# Patient Record
Sex: MALE | Race: BLACK OR AFRICAN AMERICAN | NOT HISPANIC OR LATINO | Employment: OTHER | URBAN - METROPOLITAN AREA
[De-identification: names, ages, dates, MRNs, and addresses within clinical notes are randomized per-mention and may not be internally consistent; named-entity substitution may affect disease eponyms.]

---

## 2017-01-25 ENCOUNTER — APPOINTMENT (OUTPATIENT)
Dept: LAB | Facility: CLINIC | Age: 67
End: 2017-01-25
Payer: MEDICARE

## 2017-01-25 ENCOUNTER — TRANSCRIBE ORDERS (OUTPATIENT)
Dept: LAB | Facility: CLINIC | Age: 67
End: 2017-01-25

## 2017-01-25 ENCOUNTER — ALLSCRIPTS OFFICE VISIT (OUTPATIENT)
Dept: OTHER | Facility: OTHER | Age: 67
End: 2017-01-25

## 2017-01-25 DIAGNOSIS — C22.0 LIVER CELL CARCINOMA (HCC): ICD-10-CM

## 2017-01-25 LAB
ALBUMIN SERPL BCP-MCNC: 2.7 G/DL (ref 3.5–5)
ALP SERPL-CCNC: 187 U/L (ref 46–116)
ALT SERPL W P-5'-P-CCNC: 98 U/L (ref 12–78)
ANION GAP SERPL CALCULATED.3IONS-SCNC: 5 MMOL/L (ref 4–13)
AST SERPL W P-5'-P-CCNC: 165 U/L (ref 5–45)
BILIRUB SERPL-MCNC: 3.4 MG/DL (ref 0.2–1)
BUN SERPL-MCNC: 7 MG/DL (ref 5–25)
CALCIUM SERPL-MCNC: 8.5 MG/DL (ref 8.3–10.1)
CHLORIDE SERPL-SCNC: 106 MMOL/L (ref 100–108)
CO2 SERPL-SCNC: 29 MMOL/L (ref 21–32)
CREAT SERPL-MCNC: 1.07 MG/DL (ref 0.6–1.3)
GFR SERPL CREATININE-BSD FRML MDRD: >60 ML/MIN/1.73SQ M
GLUCOSE SERPL-MCNC: 110 MG/DL (ref 65–140)
POTASSIUM SERPL-SCNC: 3.6 MMOL/L (ref 3.5–5.3)
PROT SERPL-MCNC: 7.7 G/DL (ref 6.4–8.2)
SODIUM SERPL-SCNC: 140 MMOL/L (ref 136–145)

## 2017-01-25 PROCEDURE — 80053 COMPREHEN METABOLIC PANEL: CPT

## 2017-01-25 PROCEDURE — 36415 COLL VENOUS BLD VENIPUNCTURE: CPT

## 2017-01-26 ENCOUNTER — GENERIC CONVERSION - ENCOUNTER (OUTPATIENT)
Dept: OTHER | Facility: OTHER | Age: 67
End: 2017-01-26

## 2017-01-30 ENCOUNTER — APPOINTMENT (OUTPATIENT)
Dept: LAB | Facility: CLINIC | Age: 67
End: 2017-01-30
Payer: MEDICARE

## 2017-01-30 DIAGNOSIS — C22.0 LIVER CELL CARCINOMA (HCC): ICD-10-CM

## 2017-01-30 LAB
ALBUMIN SERPL BCP-MCNC: 2.5 G/DL (ref 3.5–5)
ALP SERPL-CCNC: 175 U/L (ref 46–116)
ALT SERPL W P-5'-P-CCNC: 86 U/L (ref 12–78)
ANION GAP SERPL CALCULATED.3IONS-SCNC: 6 MMOL/L (ref 4–13)
AST SERPL W P-5'-P-CCNC: 154 U/L (ref 5–45)
BILIRUB SERPL-MCNC: 3.1 MG/DL (ref 0.2–1)
BUN SERPL-MCNC: 6 MG/DL (ref 5–25)
CALCIUM SERPL-MCNC: 8.6 MG/DL (ref 8.3–10.1)
CHLORIDE SERPL-SCNC: 105 MMOL/L (ref 100–108)
CO2 SERPL-SCNC: 28 MMOL/L (ref 21–32)
CREAT SERPL-MCNC: 1.05 MG/DL (ref 0.6–1.3)
GFR SERPL CREATININE-BSD FRML MDRD: >60 ML/MIN/1.73SQ M
GLUCOSE SERPL-MCNC: 200 MG/DL (ref 65–140)
POTASSIUM SERPL-SCNC: 4 MMOL/L (ref 3.5–5.3)
PROT SERPL-MCNC: 7.6 G/DL (ref 6.4–8.2)
SODIUM SERPL-SCNC: 139 MMOL/L (ref 136–145)

## 2017-01-30 PROCEDURE — 36415 COLL VENOUS BLD VENIPUNCTURE: CPT

## 2017-01-30 PROCEDURE — 80053 COMPREHEN METABOLIC PANEL: CPT

## 2017-01-31 ENCOUNTER — ALLSCRIPTS OFFICE VISIT (OUTPATIENT)
Dept: OTHER | Facility: OTHER | Age: 67
End: 2017-01-31

## 2017-01-31 ENCOUNTER — GENERIC CONVERSION - ENCOUNTER (OUTPATIENT)
Dept: OTHER | Facility: OTHER | Age: 67
End: 2017-01-31

## 2017-02-01 ENCOUNTER — HOSPITAL ENCOUNTER (OUTPATIENT)
Dept: CT IMAGING | Facility: HOSPITAL | Age: 67
Discharge: HOME | End: 2017-02-01
Attending: SURGERY
Payer: MEDICARE

## 2017-02-01 DIAGNOSIS — C22.0 LIVER CELL CARCINOMA (HCC): ICD-10-CM

## 2017-02-01 PROCEDURE — 74177 CT ABD & PELVIS W/CONTRAST: CPT

## 2017-02-01 RX ADMIN — IOHEXOL 100 ML: 350 INJECTION, SOLUTION INTRAVENOUS at 09:08

## 2017-02-15 ENCOUNTER — TRANSCRIBE ORDERS (OUTPATIENT)
Dept: LAB | Facility: CLINIC | Age: 67
End: 2017-02-15

## 2017-02-15 ENCOUNTER — ALLSCRIPTS OFFICE VISIT (OUTPATIENT)
Dept: OTHER | Facility: OTHER | Age: 67
End: 2017-02-15

## 2017-02-15 ENCOUNTER — APPOINTMENT (OUTPATIENT)
Dept: LAB | Facility: CLINIC | Age: 67
End: 2017-02-15
Payer: MEDICARE

## 2017-02-15 DIAGNOSIS — R18.8 OTHER ASCITES: ICD-10-CM

## 2017-02-15 DIAGNOSIS — C22.0 LIVER CELL CARCINOMA (HCC): ICD-10-CM

## 2017-02-15 LAB
ALBUMIN SERPL BCP-MCNC: 2.7 G/DL (ref 3.5–5)
ALP SERPL-CCNC: 196 U/L (ref 46–116)
ALT SERPL W P-5'-P-CCNC: 95 U/L (ref 12–78)
ANION GAP SERPL CALCULATED.3IONS-SCNC: 4 MMOL/L (ref 4–13)
AST SERPL W P-5'-P-CCNC: 176 U/L (ref 5–45)
BILIRUB DIRECT SERPL-MCNC: 1.66 MG/DL (ref 0–0.2)
BILIRUB SERPL-MCNC: 2.5 MG/DL (ref 0.2–1)
BUN SERPL-MCNC: 8 MG/DL (ref 5–25)
CALCIUM SERPL-MCNC: 8.2 MG/DL (ref 8.3–10.1)
CHLORIDE SERPL-SCNC: 105 MMOL/L (ref 100–108)
CO2 SERPL-SCNC: 32 MMOL/L (ref 21–32)
CREAT SERPL-MCNC: 1.08 MG/DL (ref 0.6–1.3)
ERYTHROCYTE [DISTWIDTH] IN BLOOD BY AUTOMATED COUNT: 13.5 % (ref 11.6–15.1)
GFR SERPL CREATININE-BSD FRML MDRD: >60 ML/MIN/1.73SQ M
GLUCOSE SERPL-MCNC: 84 MG/DL (ref 65–140)
HCT VFR BLD AUTO: 38.1 % (ref 36.5–49.3)
HGB BLD-MCNC: 12.5 G/DL (ref 12–17)
INR PPP: 1.88 (ref 0.86–1.16)
MCH RBC QN AUTO: 32.4 PG (ref 26.8–34.3)
MCHC RBC AUTO-ENTMCNC: 32.8 G/DL (ref 31.4–37.4)
MCV RBC AUTO: 99 FL (ref 82–98)
PLATELET # BLD AUTO: 72 THOUSANDS/UL (ref 149–390)
PMV BLD AUTO: 11 FL (ref 8.9–12.7)
POTASSIUM SERPL-SCNC: 3.4 MMOL/L (ref 3.5–5.3)
PROT SERPL-MCNC: 8 G/DL (ref 6.4–8.2)
PROTHROMBIN TIME: 21 SECONDS (ref 12–14.3)
RBC # BLD AUTO: 3.86 MILLION/UL (ref 3.88–5.62)
SODIUM SERPL-SCNC: 141 MMOL/L (ref 136–145)
WBC # BLD AUTO: 5.41 THOUSAND/UL (ref 4.31–10.16)

## 2017-02-15 PROCEDURE — 82105 ALPHA-FETOPROTEIN SERUM: CPT

## 2017-02-15 PROCEDURE — 85027 COMPLETE CBC AUTOMATED: CPT

## 2017-02-15 PROCEDURE — 36415 COLL VENOUS BLD VENIPUNCTURE: CPT

## 2017-02-15 PROCEDURE — 80048 BASIC METABOLIC PNL TOTAL CA: CPT

## 2017-02-15 PROCEDURE — 85610 PROTHROMBIN TIME: CPT

## 2017-02-15 PROCEDURE — 80076 HEPATIC FUNCTION PANEL: CPT

## 2017-02-16 ENCOUNTER — HOSPITAL ENCOUNTER (OUTPATIENT)
Dept: INFUSION CENTER | Facility: HOSPITAL | Age: 67
Discharge: HOME | End: 2017-02-16
Payer: MEDICARE

## 2017-02-16 ENCOUNTER — GENERIC CONVERSION - ENCOUNTER (OUTPATIENT)
Dept: OTHER | Facility: OTHER | Age: 67
End: 2017-02-16

## 2017-02-16 ENCOUNTER — HOSPITAL ENCOUNTER (OUTPATIENT)
Dept: RADIOLOGY | Facility: HOSPITAL | Age: 67
Discharge: HOME | End: 2017-02-16
Attending: INTERNAL MEDICINE | Admitting: RADIOLOGY
Payer: MEDICARE

## 2017-02-16 VITALS
DIASTOLIC BLOOD PRESSURE: 72 MMHG | HEART RATE: 72 BPM | OXYGEN SATURATION: 98 % | SYSTOLIC BLOOD PRESSURE: 125 MMHG | RESPIRATION RATE: 20 BRPM

## 2017-02-16 DIAGNOSIS — R18.8 OTHER ASCITES: ICD-10-CM

## 2017-02-16 LAB
AFP-TM SERPL-MCNC: 112.7 NG/ML (ref 0–8.3)
ALBUMIN FLD-MCNC: 0.6 G/DL
BASOPHILS NFR FLD MANUAL: 1 %
HISTIOCYTES NFR FLD: 6 %
LYMPHOCYTES NFR BLD AUTO: 11 %
MONO+MESO NFR FLD MANUAL: 8 %
MONOCYTES NFR BLD AUTO: 68 %
NEUTS SEG NFR BLD AUTO: 6 %
SITE: NORMAL
TOTAL CELLS COUNTED SPEC: 100
WBC # FLD MANUAL: 114 /UL

## 2017-02-16 PROCEDURE — 49083 ABD PARACENTESIS W/IMAGING: CPT

## 2017-02-16 PROCEDURE — 82042 OTHER SOURCE ALBUMIN QUAN EA: CPT | Performed by: INTERNAL MEDICINE

## 2017-02-16 PROCEDURE — 89051 BODY FLUID CELL COUNT: CPT | Performed by: INTERNAL MEDICINE

## 2017-02-16 PROCEDURE — 88112 CYTOPATH CELL ENHANCE TECH: CPT | Performed by: INTERNAL MEDICINE

## 2017-02-22 DIAGNOSIS — Z86.010 HISTORY OF COLONIC POLYPS: ICD-10-CM

## 2017-02-22 DIAGNOSIS — C22.0 LIVER CELL CARCINOMA (HCC): ICD-10-CM

## 2017-02-22 DIAGNOSIS — R18.8 OTHER ASCITES: ICD-10-CM

## 2017-02-27 ENCOUNTER — GENERIC CONVERSION - ENCOUNTER (OUTPATIENT)
Dept: OTHER | Facility: OTHER | Age: 67
End: 2017-02-27

## 2017-02-27 ENCOUNTER — HOSPITAL ENCOUNTER (OUTPATIENT)
Dept: MRI IMAGING | Facility: HOSPITAL | Age: 67
Discharge: HOME | End: 2017-02-27
Attending: RADIOLOGY
Payer: MEDICARE

## 2017-02-27 DIAGNOSIS — C22.0 LIVER CELL CARCINOMA (HCC): ICD-10-CM

## 2017-02-27 PROCEDURE — 74183 MRI ABD W/O CNTR FLWD CNTR: CPT

## 2017-02-27 PROCEDURE — A9585 GADOBUTROL INJECTION: HCPCS | Performed by: RADIOLOGY

## 2017-02-27 RX ADMIN — GADOBUTROL 7 ML: 604.72 INJECTION INTRAVENOUS at 08:56

## 2017-02-28 ENCOUNTER — ALLSCRIPTS OFFICE VISIT (OUTPATIENT)
Dept: OTHER | Facility: OTHER | Age: 67
End: 2017-02-28

## 2017-03-01 ENCOUNTER — GENERIC CONVERSION - ENCOUNTER (OUTPATIENT)
Dept: OTHER | Facility: OTHER | Age: 67
End: 2017-03-01

## 2017-03-01 ENCOUNTER — APPOINTMENT (OUTPATIENT)
Dept: RADIATION ONCOLOGY | Facility: HOSPITAL | Age: 67
End: 2017-03-01
Attending: RADIOLOGY
Payer: MEDICARE

## 2017-03-01 PROCEDURE — 99214 OFFICE O/P EST MOD 30 MIN: CPT | Performed by: RADIOLOGY

## 2017-03-06 ENCOUNTER — ALLSCRIPTS OFFICE VISIT (OUTPATIENT)
Dept: OTHER | Facility: OTHER | Age: 67
End: 2017-03-06

## 2017-03-28 ENCOUNTER — ALLSCRIPTS OFFICE VISIT (OUTPATIENT)
Dept: OTHER | Facility: OTHER | Age: 67
End: 2017-03-28

## 2017-03-28 DIAGNOSIS — C22.0 LIVER CELL CARCINOMA (HCC): ICD-10-CM

## 2017-04-06 ENCOUNTER — ALLSCRIPTS OFFICE VISIT (OUTPATIENT)
Dept: OTHER | Facility: OTHER | Age: 67
End: 2017-04-06

## 2017-04-11 ENCOUNTER — ALLSCRIPTS OFFICE VISIT (OUTPATIENT)
Dept: OTHER | Facility: OTHER | Age: 67
End: 2017-04-11

## 2017-04-11 ENCOUNTER — GENERIC CONVERSION - ENCOUNTER (OUTPATIENT)
Dept: OTHER | Facility: OTHER | Age: 67
End: 2017-04-11

## 2017-05-01 ENCOUNTER — APPOINTMENT (OUTPATIENT)
Dept: LAB | Facility: CLINIC | Age: 67
End: 2017-05-01
Payer: MEDICARE

## 2017-05-01 ENCOUNTER — ALLSCRIPTS OFFICE VISIT (OUTPATIENT)
Dept: OTHER | Facility: OTHER | Age: 67
End: 2017-05-01

## 2017-05-01 ENCOUNTER — TRANSCRIBE ORDERS (OUTPATIENT)
Dept: LAB | Facility: CLINIC | Age: 67
End: 2017-05-01

## 2017-05-01 DIAGNOSIS — Z86.010 HISTORY OF COLONIC POLYPS: ICD-10-CM

## 2017-05-01 DIAGNOSIS — K74.60 CIRRHOSIS OF LIVER (HCC): ICD-10-CM

## 2017-05-01 DIAGNOSIS — K76.9 LIVER DISEASE: ICD-10-CM

## 2017-05-01 DIAGNOSIS — C22.0 LIVER CELL CARCINOMA (HCC): ICD-10-CM

## 2017-05-01 LAB
ALBUMIN SERPL BCP-MCNC: 2.4 G/DL (ref 3.5–5)
ALP SERPL-CCNC: 173 U/L (ref 46–116)
ALT SERPL W P-5'-P-CCNC: 61 U/L (ref 12–78)
ANION GAP SERPL CALCULATED.3IONS-SCNC: 5 MMOL/L (ref 4–13)
AST SERPL W P-5'-P-CCNC: 93 U/L (ref 5–45)
BASOPHILS # BLD AUTO: 0.03 THOUSANDS/ΜL (ref 0–0.1)
BASOPHILS NFR BLD AUTO: 1 % (ref 0–1)
BILIRUB SERPL-MCNC: 3.1 MG/DL (ref 0.2–1)
BUN SERPL-MCNC: 9 MG/DL (ref 5–25)
CALCIUM SERPL-MCNC: 8.4 MG/DL (ref 8.3–10.1)
CHLORIDE SERPL-SCNC: 107 MMOL/L (ref 100–108)
CO2 SERPL-SCNC: 32 MMOL/L (ref 21–32)
CREAT SERPL-MCNC: 1.02 MG/DL (ref 0.6–1.3)
EOSINOPHIL # BLD AUTO: 0.34 THOUSAND/ΜL (ref 0–0.61)
EOSINOPHIL NFR BLD AUTO: 6 % (ref 0–6)
ERYTHROCYTE [DISTWIDTH] IN BLOOD BY AUTOMATED COUNT: 14.4 % (ref 11.6–15.1)
GFR SERPL CREATININE-BSD FRML MDRD: >60 ML/MIN/1.73SQ M
GLUCOSE SERPL-MCNC: 114 MG/DL (ref 65–140)
HCT VFR BLD AUTO: 37.9 % (ref 36.5–49.3)
HGB BLD-MCNC: 12.8 G/DL (ref 12–17)
LYMPHOCYTES # BLD AUTO: 1.1 THOUSANDS/ΜL (ref 0.6–4.47)
LYMPHOCYTES NFR BLD AUTO: 19 % (ref 14–44)
MCH RBC QN AUTO: 33.3 PG (ref 26.8–34.3)
MCHC RBC AUTO-ENTMCNC: 33.8 G/DL (ref 31.4–37.4)
MCV RBC AUTO: 99 FL (ref 82–98)
MONOCYTES # BLD AUTO: 0.66 THOUSAND/ΜL (ref 0.17–1.22)
MONOCYTES NFR BLD AUTO: 11 % (ref 4–12)
NEUTROPHILS # BLD AUTO: 3.83 THOUSANDS/ΜL (ref 1.85–7.62)
NEUTS SEG NFR BLD AUTO: 63 % (ref 43–75)
PLATELET # BLD AUTO: 78 THOUSANDS/UL (ref 149–390)
PMV BLD AUTO: 10.3 FL (ref 8.9–12.7)
POTASSIUM SERPL-SCNC: 4.1 MMOL/L (ref 3.5–5.3)
PROT SERPL-MCNC: 8 G/DL (ref 6.4–8.2)
RBC # BLD AUTO: 3.84 MILLION/UL (ref 3.88–5.62)
SODIUM SERPL-SCNC: 144 MMOL/L (ref 136–145)
WBC # BLD AUTO: 5.96 THOUSAND/UL (ref 4.31–10.16)

## 2017-05-01 PROCEDURE — 80053 COMPREHEN METABOLIC PANEL: CPT

## 2017-05-01 PROCEDURE — 85025 COMPLETE CBC W/AUTO DIFF WBC: CPT

## 2017-05-01 PROCEDURE — 82105 ALPHA-FETOPROTEIN SERUM: CPT

## 2017-05-01 PROCEDURE — 36415 COLL VENOUS BLD VENIPUNCTURE: CPT

## 2017-05-02 LAB — AFP-TM SERPL-MCNC: 27.7 NG/ML (ref 0–8.3)

## 2017-05-04 ENCOUNTER — ALLSCRIPTS OFFICE VISIT (OUTPATIENT)
Dept: OTHER | Facility: OTHER | Age: 67
End: 2017-05-04

## 2017-05-05 ENCOUNTER — HOSPITAL ENCOUNTER (OUTPATIENT)
Dept: RADIOLOGY | Facility: HOSPITAL | Age: 67
Discharge: HOME | End: 2017-05-05
Attending: INTERNAL MEDICINE | Admitting: RADIOLOGY
Payer: MEDICARE

## 2017-05-05 VITALS
SYSTOLIC BLOOD PRESSURE: 128 MMHG | RESPIRATION RATE: 18 BRPM | OXYGEN SATURATION: 100 % | DIASTOLIC BLOOD PRESSURE: 79 MMHG | HEART RATE: 62 BPM

## 2017-05-05 DIAGNOSIS — R18.8 ASCITES: ICD-10-CM

## 2017-05-05 PROCEDURE — 49083 ABD PARACENTESIS W/IMAGING: CPT

## 2017-05-10 ENCOUNTER — GENERIC CONVERSION - ENCOUNTER (OUTPATIENT)
Dept: OTHER | Facility: OTHER | Age: 67
End: 2017-05-10

## 2017-05-19 ENCOUNTER — ALLSCRIPTS OFFICE VISIT (OUTPATIENT)
Dept: OTHER | Facility: OTHER | Age: 67
End: 2017-05-19

## 2017-05-19 ENCOUNTER — APPOINTMENT (OUTPATIENT)
Dept: LAB | Facility: CLINIC | Age: 67
End: 2017-05-19
Payer: MEDICARE

## 2017-05-19 DIAGNOSIS — C22.0 LIVER CELL CARCINOMA (HCC): ICD-10-CM

## 2017-05-19 LAB
ALBUMIN SERPL BCP-MCNC: 2.3 G/DL (ref 3.5–5)
ALP SERPL-CCNC: 191 U/L (ref 46–116)
ALT SERPL W P-5'-P-CCNC: 67 U/L (ref 12–78)
ANION GAP SERPL CALCULATED.3IONS-SCNC: 7 MMOL/L (ref 4–13)
AST SERPL W P-5'-P-CCNC: 93 U/L (ref 5–45)
BASOPHILS # BLD AUTO: 0.03 THOUSANDS/ΜL (ref 0–0.1)
BASOPHILS NFR BLD AUTO: 1 % (ref 0–1)
BILIRUB SERPL-MCNC: 1.8 MG/DL (ref 0.2–1)
BUN SERPL-MCNC: 8 MG/DL (ref 5–25)
CALCIUM SERPL-MCNC: 8.1 MG/DL (ref 8.3–10.1)
CHLORIDE SERPL-SCNC: 107 MMOL/L (ref 100–108)
CO2 SERPL-SCNC: 27 MMOL/L (ref 21–32)
CREAT SERPL-MCNC: 0.96 MG/DL (ref 0.6–1.3)
EOSINOPHIL # BLD AUTO: 0.26 THOUSAND/ΜL (ref 0–0.61)
EOSINOPHIL NFR BLD AUTO: 5 % (ref 0–6)
ERYTHROCYTE [DISTWIDTH] IN BLOOD BY AUTOMATED COUNT: 14.2 % (ref 11.6–15.1)
GFR SERPL CREATININE-BSD FRML MDRD: >60 ML/MIN/1.73SQ M
GLUCOSE SERPL-MCNC: 87 MG/DL (ref 65–140)
HCT VFR BLD AUTO: 36.4 % (ref 36.5–49.3)
HGB BLD-MCNC: 12.7 G/DL (ref 12–17)
LYMPHOCYTES # BLD AUTO: 1.39 THOUSANDS/ΜL (ref 0.6–4.47)
LYMPHOCYTES NFR BLD AUTO: 27 % (ref 14–44)
MCH RBC QN AUTO: 34 PG (ref 26.8–34.3)
MCHC RBC AUTO-ENTMCNC: 34.9 G/DL (ref 31.4–37.4)
MCV RBC AUTO: 97 FL (ref 82–98)
MONOCYTES # BLD AUTO: 0.58 THOUSAND/ΜL (ref 0.17–1.22)
MONOCYTES NFR BLD AUTO: 11 % (ref 4–12)
NEUTROPHILS # BLD AUTO: 2.84 THOUSANDS/ΜL (ref 1.85–7.62)
NEUTS SEG NFR BLD AUTO: 56 % (ref 43–75)
PLATELET # BLD AUTO: 52 THOUSANDS/UL (ref 149–390)
PMV BLD AUTO: 10.9 FL (ref 8.9–12.7)
POTASSIUM SERPL-SCNC: 3.3 MMOL/L (ref 3.5–5.3)
PROT SERPL-MCNC: 7.7 G/DL (ref 6.4–8.2)
RBC # BLD AUTO: 3.74 MILLION/UL (ref 3.88–5.62)
SODIUM SERPL-SCNC: 141 MMOL/L (ref 136–145)
WBC # BLD AUTO: 5.1 THOUSAND/UL (ref 4.31–10.16)

## 2017-05-19 PROCEDURE — 85025 COMPLETE CBC W/AUTO DIFF WBC: CPT

## 2017-05-19 PROCEDURE — 80053 COMPREHEN METABOLIC PANEL: CPT

## 2017-05-19 PROCEDURE — 36415 COLL VENOUS BLD VENIPUNCTURE: CPT

## 2017-05-22 ENCOUNTER — GENERIC CONVERSION - ENCOUNTER (OUTPATIENT)
Dept: OTHER | Facility: OTHER | Age: 67
End: 2017-05-22

## 2017-05-22 ENCOUNTER — APPOINTMENT (OUTPATIENT)
Dept: RADIATION ONCOLOGY | Facility: HOSPITAL | Age: 67
End: 2017-05-22
Attending: RADIOLOGY
Payer: MEDICARE

## 2017-05-22 PROCEDURE — 99214 OFFICE O/P EST MOD 30 MIN: CPT | Performed by: RADIOLOGY

## 2017-05-30 ENCOUNTER — ALLSCRIPTS OFFICE VISIT (OUTPATIENT)
Dept: OTHER | Facility: OTHER | Age: 67
End: 2017-05-30

## 2017-06-01 ENCOUNTER — ALLSCRIPTS OFFICE VISIT (OUTPATIENT)
Dept: OTHER | Facility: OTHER | Age: 67
End: 2017-06-01

## 2017-06-02 DIAGNOSIS — C22.0 LIVER CELL CARCINOMA (HCC): ICD-10-CM

## 2017-06-04 ENCOUNTER — APPOINTMENT (EMERGENCY)
Dept: CT IMAGING | Facility: HOSPITAL | Age: 67
DRG: 392 | End: 2017-06-04
Payer: MEDICARE

## 2017-06-04 ENCOUNTER — HOSPITAL ENCOUNTER (INPATIENT)
Facility: HOSPITAL | Age: 67
LOS: 2 days | Discharge: HOME | DRG: 392 | End: 2017-06-06
Attending: EMERGENCY MEDICINE | Admitting: INTERNAL MEDICINE
Payer: MEDICARE

## 2017-06-04 DIAGNOSIS — C22.0 HEPATOCELLULAR CARCINOMA (HCC): ICD-10-CM

## 2017-06-04 DIAGNOSIS — D69.6 THROMBOCYTOPENIA (HCC): ICD-10-CM

## 2017-06-04 DIAGNOSIS — K85.90 ACUTE PANCREATITIS, UNSPECIFIED COMPLICATION STATUS, UNSPECIFIED PANCREATITIS TYPE: ICD-10-CM

## 2017-06-04 DIAGNOSIS — R16.0 LIVER MASS: ICD-10-CM

## 2017-06-04 DIAGNOSIS — R11.2 INTRACTABLE VOMITING WITH NAUSEA, UNSPECIFIED VOMITING TYPE: ICD-10-CM

## 2017-06-04 DIAGNOSIS — G89.3 CANCER RELATED PAIN: ICD-10-CM

## 2017-06-04 DIAGNOSIS — K52.9 COLITIS: Primary | ICD-10-CM

## 2017-06-04 PROBLEM — K76.6 PORTAL HYPERTENSION (HCC): Status: ACTIVE | Noted: 2017-06-04

## 2017-06-04 LAB
ALBUMIN SERPL BCP-MCNC: 2.6 G/DL (ref 3.5–5)
ALP SERPL-CCNC: 185 U/L (ref 46–116)
ALT SERPL W P-5'-P-CCNC: 75 U/L (ref 12–78)
ANION GAP SERPL CALCULATED.3IONS-SCNC: 7 MMOL/L (ref 4–13)
AST SERPL W P-5'-P-CCNC: 102 U/L (ref 5–45)
BASOPHILS # BLD AUTO: 0.01 THOUSANDS/ΜL (ref 0–0.1)
BASOPHILS NFR BLD AUTO: 0 % (ref 0–1)
BILIRUB DIRECT SERPL-MCNC: 2.45 MG/DL (ref 0–0.2)
BILIRUB SERPL-MCNC: 4.1 MG/DL (ref 0.2–1)
BILIRUB UR QL STRIP: NEGATIVE
BUN SERPL-MCNC: 8 MG/DL (ref 5–25)
CALCIUM SERPL-MCNC: 8 MG/DL (ref 8.3–10.1)
CHLORIDE SERPL-SCNC: 104 MMOL/L (ref 100–108)
CLARITY UR: CLEAR
CLARITY, POC: NORMAL
CO2 SERPL-SCNC: 28 MMOL/L (ref 21–32)
COLOR UR: YELLOW
COLOR, POC: YELLOW
CREAT SERPL-MCNC: 1 MG/DL (ref 0.6–1.3)
EOSINOPHIL # BLD AUTO: 0.01 THOUSAND/ΜL (ref 0–0.61)
EOSINOPHIL NFR BLD AUTO: 0 % (ref 0–6)
ERYTHROCYTE [DISTWIDTH] IN BLOOD BY AUTOMATED COUNT: 13.6 % (ref 11.6–15.1)
EXT BILIRUBIN, UA: NORMAL
EXT BLOOD URINE: NEGATIVE
EXT GLUCOSE, UA: NEGATIVE
EXT KETONES: NEGATIVE
EXT NITRITE, UA: NEGATIVE
EXT PH, UA: 7.5
EXT PROTEIN, UA: NEGATIVE
EXT SPECIFIC GRAVITY, UA: 1.01
EXT UROBILINOGEN: 2
GFR SERPL CREATININE-BSD FRML MDRD: >60 ML/MIN/1.73SQ M
GLUCOSE SERPL-MCNC: 141 MG/DL (ref 65–140)
GLUCOSE UR STRIP-MCNC: NEGATIVE MG/DL
HCT VFR BLD AUTO: 38 % (ref 36.5–49.3)
HGB BLD-MCNC: 13.4 G/DL (ref 12–17)
HGB UR QL STRIP.AUTO: NEGATIVE
KETONES UR STRIP-MCNC: NEGATIVE MG/DL
LEUKOCYTE ESTERASE UR QL STRIP: NEGATIVE
LIPASE SERPL-CCNC: 1690 U/L (ref 73–393)
LYMPHOCYTES # BLD AUTO: 0.43 THOUSANDS/ΜL (ref 0.6–4.47)
LYMPHOCYTES NFR BLD AUTO: 6 % (ref 14–44)
MCH RBC QN AUTO: 33.6 PG (ref 26.8–34.3)
MCHC RBC AUTO-ENTMCNC: 35.3 G/DL (ref 31.4–37.4)
MCV RBC AUTO: 95 FL (ref 82–98)
MONOCYTES # BLD AUTO: 0.72 THOUSAND/ΜL (ref 0.17–1.22)
MONOCYTES NFR BLD AUTO: 11 % (ref 4–12)
NEUTROPHILS # BLD AUTO: 5.52 THOUSANDS/ΜL (ref 1.85–7.62)
NEUTS SEG NFR BLD AUTO: 83 % (ref 43–75)
NITRITE UR QL STRIP: NEGATIVE
PH UR STRIP.AUTO: 6.5 [PH] (ref 4.5–8)
PLATELET # BLD AUTO: 50 THOUSANDS/UL (ref 149–390)
PLATELET # BLD AUTO: 53 THOUSANDS/UL (ref 149–390)
PMV BLD AUTO: 11.3 FL (ref 8.9–12.7)
PMV BLD AUTO: 11.7 FL (ref 8.9–12.7)
POTASSIUM SERPL-SCNC: 3.4 MMOL/L (ref 3.5–5.3)
PROT SERPL-MCNC: 8.1 G/DL (ref 6.4–8.2)
PROT UR STRIP-MCNC: NEGATIVE MG/DL
RBC # BLD AUTO: 3.99 MILLION/UL (ref 3.88–5.62)
SODIUM SERPL-SCNC: 139 MMOL/L (ref 136–145)
SP GR UR STRIP.AUTO: 1.01 (ref 1–1.03)
UROBILINOGEN UR QL STRIP.AUTO: 4 E.U./DL
WBC # BLD AUTO: 6.69 THOUSAND/UL (ref 4.31–10.16)
WBC # BLD EST: NEGATIVE 10*3/UL

## 2017-06-04 PROCEDURE — 83690 ASSAY OF LIPASE: CPT | Performed by: PHYSICIAN ASSISTANT

## 2017-06-04 PROCEDURE — 96375 TX/PRO/DX INJ NEW DRUG ADDON: CPT

## 2017-06-04 PROCEDURE — 85025 COMPLETE CBC W/AUTO DIFF WBC: CPT | Performed by: PHYSICIAN ASSISTANT

## 2017-06-04 PROCEDURE — 96361 HYDRATE IV INFUSION ADD-ON: CPT

## 2017-06-04 PROCEDURE — 87040 BLOOD CULTURE FOR BACTERIA: CPT | Performed by: INTERNAL MEDICINE

## 2017-06-04 PROCEDURE — 74177 CT ABD & PELVIS W/CONTRAST: CPT

## 2017-06-04 PROCEDURE — 36415 COLL VENOUS BLD VENIPUNCTURE: CPT | Performed by: PHYSICIAN ASSISTANT

## 2017-06-04 PROCEDURE — 94664 DEMO&/EVAL PT USE INHALER: CPT

## 2017-06-04 PROCEDURE — 94760 N-INVAS EAR/PLS OXIMETRY 1: CPT

## 2017-06-04 PROCEDURE — 81002 URINALYSIS NONAUTO W/O SCOPE: CPT | Performed by: PHYSICIAN ASSISTANT

## 2017-06-04 PROCEDURE — 81003 URINALYSIS AUTO W/O SCOPE: CPT | Performed by: INTERNAL MEDICINE

## 2017-06-04 PROCEDURE — 99285 EMERGENCY DEPT VISIT HI MDM: CPT

## 2017-06-04 PROCEDURE — 96374 THER/PROPH/DIAG INJ IV PUSH: CPT

## 2017-06-04 PROCEDURE — 96376 TX/PRO/DX INJ SAME DRUG ADON: CPT

## 2017-06-04 PROCEDURE — 82248 BILIRUBIN DIRECT: CPT | Performed by: PHYSICIAN ASSISTANT

## 2017-06-04 PROCEDURE — 85049 AUTOMATED PLATELET COUNT: CPT | Performed by: INTERNAL MEDICINE

## 2017-06-04 PROCEDURE — 80053 COMPREHEN METABOLIC PANEL: CPT | Performed by: PHYSICIAN ASSISTANT

## 2017-06-04 RX ORDER — CIPROFLOXACIN 2 MG/ML
400 INJECTION, SOLUTION INTRAVENOUS EVERY 12 HOURS
Status: DISCONTINUED | OUTPATIENT
Start: 2017-06-05 | End: 2017-06-06 | Stop reason: HOSPADM

## 2017-06-04 RX ORDER — DEXTROSE, SODIUM CHLORIDE, AND POTASSIUM CHLORIDE 5; .45; .15 G/100ML; G/100ML; G/100ML
75 INJECTION INTRAVENOUS CONTINUOUS
Status: DISCONTINUED | OUTPATIENT
Start: 2017-06-04 | End: 2017-06-06 | Stop reason: HOSPADM

## 2017-06-04 RX ORDER — BISACODYL 10 MG
10 SUPPOSITORY, RECTAL RECTAL AS NEEDED
Status: DISCONTINUED | OUTPATIENT
Start: 2017-06-04 | End: 2017-06-06 | Stop reason: HOSPADM

## 2017-06-04 RX ORDER — SORAFENIB 200 MG/1
400 TABLET, FILM COATED ORAL EVERY MORNING
COMMUNITY
Start: 2017-03-28

## 2017-06-04 RX ORDER — ONDANSETRON 2 MG/ML
4 INJECTION INTRAMUSCULAR; INTRAVENOUS EVERY 6 HOURS PRN
Status: DISCONTINUED | OUTPATIENT
Start: 2017-06-04 | End: 2017-06-06 | Stop reason: HOSPADM

## 2017-06-04 RX ORDER — MORPHINE SULFATE 10 MG/ML
6 INJECTION, SOLUTION INTRAMUSCULAR; INTRAVENOUS ONCE
Status: COMPLETED | OUTPATIENT
Start: 2017-06-04 | End: 2017-06-04

## 2017-06-04 RX ORDER — METHYLPHENIDATE HYDROCHLORIDE 10 MG/1
10 TABLET ORAL DAILY
COMMUNITY
Start: 2017-01-25

## 2017-06-04 RX ORDER — MORPHINE SULFATE 4 MG/ML
4 INJECTION, SOLUTION INTRAMUSCULAR; INTRAVENOUS ONCE
Status: COMPLETED | OUTPATIENT
Start: 2017-06-04 | End: 2017-06-04

## 2017-06-04 RX ORDER — FUROSEMIDE 40 MG/1
40 TABLET ORAL AS NEEDED
COMMUNITY
Start: 2017-02-15

## 2017-06-04 RX ORDER — MAGNESIUM HYDROXIDE/ALUMINUM HYDROXICE/SIMETHICONE 120; 1200; 1200 MG/30ML; MG/30ML; MG/30ML
30 SUSPENSION ORAL EVERY 6 HOURS PRN
Status: DISCONTINUED | OUTPATIENT
Start: 2017-06-04 | End: 2017-06-06 | Stop reason: HOSPADM

## 2017-06-04 RX ORDER — SORAFENIB 200 MG/1
400 TABLET, FILM COATED ORAL EVERY EVENING
COMMUNITY

## 2017-06-04 RX ORDER — SPIRONOLACTONE 50 MG/1
50 TABLET, FILM COATED ORAL 2 TIMES DAILY
COMMUNITY
Start: 2017-02-15

## 2017-06-04 RX ORDER — ONDANSETRON 2 MG/ML
4 INJECTION INTRAMUSCULAR; INTRAVENOUS ONCE
Status: COMPLETED | OUTPATIENT
Start: 2017-06-04 | End: 2017-06-04

## 2017-06-04 RX ORDER — FENTANYL 12 UG/H
1 PATCH TRANSDERMAL
Status: DISCONTINUED | OUTPATIENT
Start: 2017-06-04 | End: 2017-06-06 | Stop reason: HOSPADM

## 2017-06-04 RX ORDER — FENTANYL 12 UG/H
PATCH TRANSDERMAL
COMMUNITY
Start: 2017-03-06

## 2017-06-04 RX ORDER — NORTRIPTYLINE HYDROCHLORIDE 25 MG/1
25 CAPSULE ORAL
Status: DISCONTINUED | OUTPATIENT
Start: 2017-06-04 | End: 2017-06-06

## 2017-06-04 RX ORDER — NORTRIPTYLINE HYDROCHLORIDE 25 MG/1
25 CAPSULE ORAL
COMMUNITY
Start: 2016-11-30

## 2017-06-04 RX ORDER — HEPARIN SODIUM 5000 [USP'U]/ML
5000 INJECTION, SOLUTION INTRAVENOUS; SUBCUTANEOUS EVERY 8 HOURS SCHEDULED
Status: DISCONTINUED | OUTPATIENT
Start: 2017-06-04 | End: 2017-06-05

## 2017-06-04 RX ORDER — CIPROFLOXACIN 2 MG/ML
400 INJECTION, SOLUTION INTRAVENOUS ONCE
Status: COMPLETED | OUTPATIENT
Start: 2017-06-04 | End: 2017-06-04

## 2017-06-04 RX ORDER — MORPHINE SULFATE 4 MG/ML
4 INJECTION, SOLUTION INTRAMUSCULAR; INTRAVENOUS EVERY 4 HOURS PRN
Status: DISCONTINUED | OUTPATIENT
Start: 2017-06-04 | End: 2017-06-06 | Stop reason: HOSPADM

## 2017-06-04 RX ORDER — POLYETHYLENE GLYCOL 3350 17 G/17G
17 POWDER, FOR SOLUTION ORAL DAILY
Status: DISCONTINUED | OUTPATIENT
Start: 2017-06-05 | End: 2017-06-06 | Stop reason: HOSPADM

## 2017-06-04 RX ORDER — DOCUSATE SODIUM 100 MG/1
100 CAPSULE, LIQUID FILLED ORAL 2 TIMES DAILY
Status: DISCONTINUED | OUTPATIENT
Start: 2017-06-04 | End: 2017-06-06 | Stop reason: HOSPADM

## 2017-06-04 RX ORDER — SENNOSIDES 8.6 MG
2 TABLET ORAL 2 TIMES DAILY
Status: DISCONTINUED | OUTPATIENT
Start: 2017-06-04 | End: 2017-06-06 | Stop reason: HOSPADM

## 2017-06-04 RX ORDER — OXYCODONE HCL 5 MG/5 ML
5 SOLUTION, ORAL ORAL EVERY 4 HOURS PRN
Status: DISCONTINUED | OUTPATIENT
Start: 2017-06-04 | End: 2017-06-06 | Stop reason: HOSPADM

## 2017-06-04 RX ADMIN — CIPROFLOXACIN 400 MG: 2 INJECTION, SOLUTION INTRAVENOUS at 18:10

## 2017-06-04 RX ADMIN — HEPARIN SODIUM 5000 UNITS: 5000 INJECTION, SOLUTION INTRAVENOUS; SUBCUTANEOUS at 22:50

## 2017-06-04 RX ADMIN — DEXTROSE, SODIUM CHLORIDE, AND POTASSIUM CHLORIDE 75 ML/HR: 5; .45; .15 INJECTION INTRAVENOUS at 22:09

## 2017-06-04 RX ADMIN — NORTRIPTYLINE HYDROCHLORIDE 25 MG: 25 CAPSULE ORAL at 22:11

## 2017-06-04 RX ADMIN — SODIUM CHLORIDE 1000 ML: 0.9 INJECTION, SOLUTION INTRAVENOUS at 14:41

## 2017-06-04 RX ADMIN — ONDANSETRON 4 MG: 2 INJECTION INTRAMUSCULAR; INTRAVENOUS at 21:01

## 2017-06-04 RX ADMIN — OXYCODONE HYDROCHLORIDE 5 MG: 5 SOLUTION ORAL at 22:10

## 2017-06-04 RX ADMIN — MORPHINE SULFATE 6 MG: 10 INJECTION, SOLUTION INTRAMUSCULAR; INTRAVENOUS at 14:43

## 2017-06-04 RX ADMIN — FENTANYL 1 PATCH: 12.5 PATCH TRANSDERMAL at 22:09

## 2017-06-04 RX ADMIN — IOHEXOL 100 ML: 350 INJECTION, SOLUTION INTRAVENOUS at 16:16

## 2017-06-04 RX ADMIN — MORPHINE SULFATE 4 MG: 4 INJECTION, SOLUTION INTRAMUSCULAR; INTRAVENOUS at 16:33

## 2017-06-04 RX ADMIN — IOHEXOL 50 ML: 240 INJECTION, SOLUTION INTRATHECAL; INTRAVASCULAR; INTRAVENOUS; ORAL at 14:44

## 2017-06-04 RX ADMIN — ONDANSETRON 4 MG: 2 INJECTION INTRAMUSCULAR; INTRAVENOUS at 14:41

## 2017-06-04 RX ADMIN — METRONIDAZOLE 500 MG: 500 INJECTION, SOLUTION INTRAVENOUS at 19:21

## 2017-06-04 RX ADMIN — ONDANSETRON 4 MG: 2 INJECTION INTRAMUSCULAR; INTRAVENOUS at 16:28

## 2017-06-05 ENCOUNTER — APPOINTMENT (INPATIENT)
Dept: RADIOLOGY | Facility: HOSPITAL | Age: 67
DRG: 392 | End: 2017-06-05
Payer: MEDICARE

## 2017-06-05 LAB
ALBUMIN SERPL BCP-MCNC: 2.2 G/DL (ref 3.5–5)
ALP SERPL-CCNC: 139 U/L (ref 46–116)
ALT SERPL W P-5'-P-CCNC: 64 U/L (ref 12–78)
ANION GAP SERPL CALCULATED.3IONS-SCNC: 8 MMOL/L (ref 4–13)
APPEARANCE FLD: CLEAR
AST SERPL W P-5'-P-CCNC: 84 U/L (ref 5–45)
BILIRUB SERPL-MCNC: 2.7 MG/DL (ref 0.2–1)
BUN SERPL-MCNC: 7 MG/DL (ref 5–25)
CALCIUM SERPL-MCNC: 7.7 MG/DL (ref 8.3–10.1)
CHLORIDE SERPL-SCNC: 106 MMOL/L (ref 100–108)
CO2 SERPL-SCNC: 26 MMOL/L (ref 21–32)
COLOR FLD: YELLOW
CREAT SERPL-MCNC: 0.82 MG/DL (ref 0.6–1.3)
ERYTHROCYTE [DISTWIDTH] IN BLOOD BY AUTOMATED COUNT: 13.8 % (ref 11.6–15.1)
GFR SERPL CREATININE-BSD FRML MDRD: >60 ML/MIN/1.73SQ M
GLUCOSE SERPL-MCNC: 126 MG/DL (ref 65–140)
HCT VFR BLD AUTO: 36.3 % (ref 36.5–49.3)
HGB BLD-MCNC: 12.5 G/DL (ref 12–17)
INR PPP: 1.83 (ref 0.86–1.16)
LIPASE SERPL-CCNC: 372 U/L (ref 73–393)
LYMPHOCYTES NFR BLD AUTO: 4 %
MAGNESIUM SERPL-MCNC: 1.8 MG/DL (ref 1.6–2.6)
MCH RBC QN AUTO: 33.2 PG (ref 26.8–34.3)
MCHC RBC AUTO-ENTMCNC: 34.4 G/DL (ref 31.4–37.4)
MCV RBC AUTO: 96 FL (ref 82–98)
MONONUC CELLS NFR FLD MANUAL: 40 %
NEUTS SEG NFR BLD AUTO: 56 %
PLATELET # BLD AUTO: 50 THOUSANDS/UL (ref 149–390)
PMV BLD AUTO: 11.3 FL (ref 8.9–12.7)
POTASSIUM SERPL-SCNC: 3.7 MMOL/L (ref 3.5–5.3)
PROT SERPL-MCNC: 7.5 G/DL (ref 6.4–8.2)
PROTHROMBIN TIME: 21.8 SECONDS (ref 12.1–14.4)
RBC # BLD AUTO: 3.77 MILLION/UL (ref 3.88–5.62)
SITE: NORMAL
SODIUM SERPL-SCNC: 140 MMOL/L (ref 136–145)
TOTAL CELLS COUNTED SPEC: 100
WBC # BLD AUTO: 6.02 THOUSAND/UL (ref 4.31–10.16)
WBC # FLD MANUAL: 141 /UL

## 2017-06-05 PROCEDURE — 0W9G3ZX DRAINAGE OF PERITONEAL CAVITY, PERCUTANEOUS APPROACH, DIAGNOSTIC: ICD-10-PCS | Performed by: HOSPITALIST

## 2017-06-05 PROCEDURE — 89051 BODY FLUID CELL COUNT: CPT | Performed by: INTERNAL MEDICINE

## 2017-06-05 PROCEDURE — 49083 ABD PARACENTESIS W/IMAGING: CPT

## 2017-06-05 PROCEDURE — 83735 ASSAY OF MAGNESIUM: CPT | Performed by: INTERNAL MEDICINE

## 2017-06-05 PROCEDURE — 83690 ASSAY OF LIPASE: CPT | Performed by: INTERNAL MEDICINE

## 2017-06-05 PROCEDURE — 85027 COMPLETE CBC AUTOMATED: CPT | Performed by: INTERNAL MEDICINE

## 2017-06-05 PROCEDURE — 80053 COMPREHEN METABOLIC PANEL: CPT | Performed by: INTERNAL MEDICINE

## 2017-06-05 PROCEDURE — 87205 SMEAR GRAM STAIN: CPT | Performed by: INTERNAL MEDICINE

## 2017-06-05 PROCEDURE — 85610 PROTHROMBIN TIME: CPT | Performed by: INTERNAL MEDICINE

## 2017-06-05 PROCEDURE — 87070 CULTURE OTHR SPECIMN AEROBIC: CPT | Performed by: INTERNAL MEDICINE

## 2017-06-05 RX ADMIN — OXYCODONE HYDROCHLORIDE 5 MG: 5 SOLUTION ORAL at 22:31

## 2017-06-05 RX ADMIN — CIPROFLOXACIN 400 MG: 2 INJECTION, SOLUTION INTRAVENOUS at 06:05

## 2017-06-05 RX ADMIN — SENNOSIDES 17.2 MG: 8.6 TABLET, FILM COATED ORAL at 17:28

## 2017-06-05 RX ADMIN — DOCUSATE SODIUM 100 MG: 100 CAPSULE, LIQUID FILLED ORAL at 17:28

## 2017-06-05 RX ADMIN — HEPARIN SODIUM 5000 UNITS: 5000 INJECTION, SOLUTION INTRAVENOUS; SUBCUTANEOUS at 06:05

## 2017-06-05 RX ADMIN — POLYETHYLENE GLYCOL 3350 17 G: 17 POWDER, FOR SOLUTION ORAL at 08:48

## 2017-06-05 RX ADMIN — DEXTROSE, SODIUM CHLORIDE, AND POTASSIUM CHLORIDE 75 ML/HR: 5; .45; .15 INJECTION INTRAVENOUS at 13:15

## 2017-06-05 RX ADMIN — METRONIDAZOLE 500 MG: 500 INJECTION, SOLUTION INTRAVENOUS at 20:16

## 2017-06-05 RX ADMIN — DOCUSATE SODIUM 100 MG: 100 CAPSULE, LIQUID FILLED ORAL at 08:48

## 2017-06-05 RX ADMIN — METRONIDAZOLE 500 MG: 500 INJECTION, SOLUTION INTRAVENOUS at 11:30

## 2017-06-05 RX ADMIN — SENNOSIDES 17.2 MG: 8.6 TABLET, FILM COATED ORAL at 08:48

## 2017-06-05 RX ADMIN — CIPROFLOXACIN 400 MG: 2 INJECTION, SOLUTION INTRAVENOUS at 17:28

## 2017-06-05 RX ADMIN — METRONIDAZOLE 500 MG: 500 INJECTION, SOLUTION INTRAVENOUS at 02:47

## 2017-06-06 VITALS
BODY MASS INDEX: 22.28 KG/M2 | HEIGHT: 71 IN | WEIGHT: 159.17 LBS | OXYGEN SATURATION: 97 % | HEART RATE: 68 BPM | DIASTOLIC BLOOD PRESSURE: 78 MMHG | TEMPERATURE: 97.4 F | SYSTOLIC BLOOD PRESSURE: 124 MMHG | RESPIRATION RATE: 18 BRPM

## 2017-06-06 LAB
ALBUMIN SERPL BCP-MCNC: 2 G/DL (ref 3.5–5)
ALP SERPL-CCNC: 137 U/L (ref 46–116)
ALT SERPL W P-5'-P-CCNC: 59 U/L (ref 12–78)
ANION GAP SERPL CALCULATED.3IONS-SCNC: 6 MMOL/L (ref 4–13)
AST SERPL W P-5'-P-CCNC: 69 U/L (ref 5–45)
BILIRUB SERPL-MCNC: 2.2 MG/DL (ref 0.2–1)
BUN SERPL-MCNC: 6 MG/DL (ref 5–25)
CALCIUM SERPL-MCNC: 7.4 MG/DL (ref 8.3–10.1)
CHLORIDE SERPL-SCNC: 107 MMOL/L (ref 100–108)
CO2 SERPL-SCNC: 28 MMOL/L (ref 21–32)
CREAT SERPL-MCNC: 0.89 MG/DL (ref 0.6–1.3)
ERYTHROCYTE [DISTWIDTH] IN BLOOD BY AUTOMATED COUNT: 13.6 % (ref 11.6–15.1)
GFR SERPL CREATININE-BSD FRML MDRD: >60 ML/MIN/1.73SQ M
GLUCOSE SERPL-MCNC: 99 MG/DL (ref 65–140)
HCT VFR BLD AUTO: 37.3 % (ref 36.5–49.3)
HGB BLD-MCNC: 12.7 G/DL (ref 12–17)
INR PPP: 2.08 (ref 0.86–1.16)
LIPASE SERPL-CCNC: 672 U/L (ref 73–393)
MAGNESIUM SERPL-MCNC: 1.7 MG/DL (ref 1.6–2.6)
MCH RBC QN AUTO: 32.9 PG (ref 26.8–34.3)
MCHC RBC AUTO-ENTMCNC: 34 G/DL (ref 31.4–37.4)
MCV RBC AUTO: 97 FL (ref 82–98)
PLATELET # BLD AUTO: 51 THOUSANDS/UL (ref 149–390)
PMV BLD AUTO: 11.5 FL (ref 8.9–12.7)
POTASSIUM SERPL-SCNC: 3.4 MMOL/L (ref 3.5–5.3)
PROT SERPL-MCNC: 6.8 G/DL (ref 6.4–8.2)
PROTHROMBIN TIME: 24.1 SECONDS (ref 12.1–14.4)
RBC # BLD AUTO: 3.86 MILLION/UL (ref 3.88–5.62)
SODIUM SERPL-SCNC: 141 MMOL/L (ref 136–145)
WBC # BLD AUTO: 5.97 THOUSAND/UL (ref 4.31–10.16)

## 2017-06-06 PROCEDURE — 83690 ASSAY OF LIPASE: CPT | Performed by: INTERNAL MEDICINE

## 2017-06-06 PROCEDURE — 83735 ASSAY OF MAGNESIUM: CPT | Performed by: INTERNAL MEDICINE

## 2017-06-06 PROCEDURE — 87177 OVA AND PARASITES SMEARS: CPT | Performed by: INTERNAL MEDICINE

## 2017-06-06 PROCEDURE — 87015 SPECIMEN INFECT AGNT CONCNTJ: CPT | Performed by: INTERNAL MEDICINE

## 2017-06-06 PROCEDURE — 80053 COMPREHEN METABOLIC PANEL: CPT | Performed by: INTERNAL MEDICINE

## 2017-06-06 PROCEDURE — 85610 PROTHROMBIN TIME: CPT | Performed by: NURSE PRACTITIONER

## 2017-06-06 PROCEDURE — 89055 LEUKOCYTE ASSESSMENT FECAL: CPT | Performed by: INTERNAL MEDICINE

## 2017-06-06 PROCEDURE — 87899 AGENT NOS ASSAY W/OPTIC: CPT | Performed by: INTERNAL MEDICINE

## 2017-06-06 PROCEDURE — 87493 C DIFF AMPLIFIED PROBE: CPT | Performed by: INTERNAL MEDICINE

## 2017-06-06 PROCEDURE — 85027 COMPLETE CBC AUTOMATED: CPT | Performed by: INTERNAL MEDICINE

## 2017-06-06 PROCEDURE — 87046 STOOL CULTR AEROBIC BACT EA: CPT | Performed by: INTERNAL MEDICINE

## 2017-06-06 PROCEDURE — 87045 FECES CULTURE AEROBIC BACT: CPT | Performed by: INTERNAL MEDICINE

## 2017-06-06 PROCEDURE — 82105 ALPHA-FETOPROTEIN SERUM: CPT | Performed by: PHYSICIAN ASSISTANT

## 2017-06-06 PROCEDURE — 87209 SMEAR COMPLEX STAIN: CPT | Performed by: INTERNAL MEDICINE

## 2017-06-06 RX ORDER — CIPROFLOXACIN 500 MG/1
500 TABLET, FILM COATED ORAL 2 TIMES DAILY
Qty: 10 TABLET | Refills: 0 | Status: SHIPPED | OUTPATIENT
Start: 2017-06-06 | End: 2017-06-11

## 2017-06-06 RX ORDER — METRONIDAZOLE 500 MG/1
500 TABLET ORAL 3 TIMES DAILY
Qty: 15 TABLET | Refills: 0 | Status: SHIPPED | OUTPATIENT
Start: 2017-06-06 | End: 2017-06-11

## 2017-06-06 RX ORDER — POLYETHYLENE GLYCOL 3350 17 G/17G
17 POWDER, FOR SOLUTION ORAL DAILY
Qty: 14 EACH | Refills: 0 | Status: SHIPPED | OUTPATIENT
Start: 2017-06-06

## 2017-06-06 RX ORDER — MAGNESIUM HYDROXIDE/ALUMINUM HYDROXICE/SIMETHICONE 120; 1200; 1200 MG/30ML; MG/30ML; MG/30ML
30 SUSPENSION ORAL EVERY 6 HOURS PRN
Qty: 355 ML | Refills: 0 | Status: SHIPPED | OUTPATIENT
Start: 2017-06-06

## 2017-06-06 RX ADMIN — METRONIDAZOLE 500 MG: 500 INJECTION, SOLUTION INTRAVENOUS at 11:34

## 2017-06-06 RX ADMIN — DEXTROSE, SODIUM CHLORIDE, AND POTASSIUM CHLORIDE 75 ML/HR: 5; .45; .15 INJECTION INTRAVENOUS at 05:22

## 2017-06-06 RX ADMIN — CIPROFLOXACIN 400 MG: 2 INJECTION, SOLUTION INTRAVENOUS at 05:23

## 2017-06-06 RX ADMIN — FENTANYL 1 PATCH: 12.5 PATCH TRANSDERMAL at 07:03

## 2017-06-06 RX ADMIN — METRONIDAZOLE 500 MG: 500 INJECTION, SOLUTION INTRAVENOUS at 04:17

## 2017-06-07 LAB
AFP-TM SERPL-MCNC: 23.6 NG/ML (ref 0–8.3)
C DIFF TOX GENS STL QL NAA+PROBE: NORMAL

## 2017-06-08 LAB
BACTERIA SPEC BFLD CULT: NO GROWTH
BACTERIA STL CULT: NORMAL
BACTERIA STL CULT: NORMAL
GRAM STN SPEC: NORMAL

## 2017-06-09 LAB
O+P STL CONC: NORMAL
WBC SPEC QL GRAM STN: NORMAL

## 2017-06-10 LAB
BACTERIA BLD CULT: NORMAL
BACTERIA BLD CULT: NORMAL

## 2017-06-20 ENCOUNTER — GENERIC CONVERSION - ENCOUNTER (OUTPATIENT)
Dept: OTHER | Facility: OTHER | Age: 67
End: 2017-06-20

## 2017-06-27 ENCOUNTER — TRANSCRIBE ORDERS (OUTPATIENT)
Dept: LAB | Facility: HOSPITAL | Age: 67
End: 2017-06-27

## 2017-06-27 ENCOUNTER — APPOINTMENT (OUTPATIENT)
Dept: LAB | Facility: HOSPITAL | Age: 67
End: 2017-06-27
Attending: INTERNAL MEDICINE
Payer: MEDICARE

## 2017-06-27 DIAGNOSIS — Z86.010 HISTORY OF COLONIC POLYPS: ICD-10-CM

## 2017-06-27 DIAGNOSIS — C22.0 LIVER CELL CARCINOMA (HCC): ICD-10-CM

## 2017-06-27 LAB
ALBUMIN SERPL BCP-MCNC: 2.6 G/DL (ref 3.5–5)
ALP SERPL-CCNC: 198 U/L (ref 46–116)
ALT SERPL W P-5'-P-CCNC: 109 U/L (ref 12–78)
ANION GAP SERPL CALCULATED.3IONS-SCNC: 5 MMOL/L (ref 4–13)
AST SERPL W P-5'-P-CCNC: 84 U/L (ref 5–45)
BASOPHILS # BLD AUTO: 0.01 THOUSANDS/ΜL (ref 0–0.1)
BASOPHILS NFR BLD AUTO: 0 % (ref 0–1)
BILIRUB SERPL-MCNC: 3.02 MG/DL (ref 0.2–1)
BUN SERPL-MCNC: 14 MG/DL (ref 5–25)
CALCIUM SERPL-MCNC: 8.8 MG/DL (ref 8.3–10.1)
CHLORIDE SERPL-SCNC: 106 MMOL/L (ref 100–108)
CO2 SERPL-SCNC: 29 MMOL/L (ref 21–32)
CREAT SERPL-MCNC: 0.87 MG/DL (ref 0.6–1.3)
EOSINOPHIL # BLD AUTO: 0.07 THOUSAND/ΜL (ref 0–0.61)
EOSINOPHIL NFR BLD AUTO: 1 % (ref 0–6)
ERYTHROCYTE [DISTWIDTH] IN BLOOD BY AUTOMATED COUNT: 14.1 % (ref 11.6–15.1)
GFR SERPL CREATININE-BSD FRML MDRD: >60 ML/MIN/1.73SQ M
GLUCOSE SERPL-MCNC: 111 MG/DL (ref 65–140)
HCT VFR BLD AUTO: 42.3 % (ref 36.5–49.3)
HGB BLD-MCNC: 14.9 G/DL (ref 12–17)
LYMPHOCYTES # BLD AUTO: 1.35 THOUSANDS/ΜL (ref 0.6–4.47)
LYMPHOCYTES NFR BLD AUTO: 17 % (ref 14–44)
MCH RBC QN AUTO: 34.3 PG (ref 26.8–34.3)
MCHC RBC AUTO-ENTMCNC: 35.2 G/DL (ref 31.4–37.4)
MCV RBC AUTO: 98 FL (ref 82–98)
MONOCYTES # BLD AUTO: 0.56 THOUSAND/ΜL (ref 0.17–1.22)
MONOCYTES NFR BLD AUTO: 7 % (ref 4–12)
NEUTROPHILS # BLD AUTO: 5.99 THOUSANDS/ΜL (ref 1.85–7.62)
NEUTS SEG NFR BLD AUTO: 75 % (ref 43–75)
NRBC BLD AUTO-RTO: 0 /100 WBCS
PLATELET # BLD AUTO: 43 THOUSANDS/UL (ref 149–390)
PMV BLD AUTO: 11.8 FL (ref 8.9–12.7)
POTASSIUM SERPL-SCNC: 3.8 MMOL/L (ref 3.5–5.3)
PROT SERPL-MCNC: 8.1 G/DL (ref 6.4–8.2)
RBC # BLD AUTO: 4.34 MILLION/UL (ref 3.88–5.62)
SODIUM SERPL-SCNC: 140 MMOL/L (ref 136–145)
WBC # BLD AUTO: 8 THOUSAND/UL (ref 4.31–10.16)

## 2017-06-27 PROCEDURE — 80053 COMPREHEN METABOLIC PANEL: CPT

## 2017-06-27 PROCEDURE — 36415 COLL VENOUS BLD VENIPUNCTURE: CPT

## 2017-06-27 PROCEDURE — 85025 COMPLETE CBC W/AUTO DIFF WBC: CPT

## 2017-06-28 ENCOUNTER — ALLSCRIPTS OFFICE VISIT (OUTPATIENT)
Dept: OTHER | Facility: OTHER | Age: 67
End: 2017-06-28

## 2017-06-29 ENCOUNTER — ALLSCRIPTS OFFICE VISIT (OUTPATIENT)
Dept: OTHER | Facility: OTHER | Age: 67
End: 2017-06-29

## 2017-07-26 ENCOUNTER — APPOINTMENT (OUTPATIENT)
Dept: LAB | Facility: HOSPITAL | Age: 67
End: 2017-07-26
Attending: INTERNAL MEDICINE
Payer: MEDICARE

## 2017-07-26 DIAGNOSIS — C22.0 LIVER CELL CARCINOMA (HCC): ICD-10-CM

## 2017-07-26 LAB
ALBUMIN SERPL BCP-MCNC: 2.3 G/DL (ref 3.5–5)
ALP SERPL-CCNC: 122 U/L (ref 46–116)
ALT SERPL W P-5'-P-CCNC: 48 U/L (ref 12–78)
ANION GAP SERPL CALCULATED.3IONS-SCNC: 3 MMOL/L (ref 4–13)
AST SERPL W P-5'-P-CCNC: 73 U/L (ref 5–45)
BASOPHILS # BLD AUTO: 0.03 THOUSANDS/ΜL (ref 0–0.1)
BASOPHILS NFR BLD AUTO: 0 % (ref 0–1)
BILIRUB SERPL-MCNC: 1.83 MG/DL (ref 0.2–1)
BUN SERPL-MCNC: 6 MG/DL (ref 5–25)
CALCIUM SERPL-MCNC: 8.2 MG/DL (ref 8.3–10.1)
CHLORIDE SERPL-SCNC: 106 MMOL/L (ref 100–108)
CO2 SERPL-SCNC: 29 MMOL/L (ref 21–32)
CREAT SERPL-MCNC: 0.84 MG/DL (ref 0.6–1.3)
EOSINOPHIL # BLD AUTO: 0.83 THOUSAND/ΜL (ref 0–0.61)
EOSINOPHIL NFR BLD AUTO: 12 % (ref 0–6)
ERYTHROCYTE [DISTWIDTH] IN BLOOD BY AUTOMATED COUNT: 14.1 % (ref 11.6–15.1)
GFR SERPL CREATININE-BSD FRML MDRD: 104 ML/MIN/1.73SQ M
GLUCOSE SERPL-MCNC: 104 MG/DL (ref 65–140)
HCT VFR BLD AUTO: 40.3 % (ref 36.5–49.3)
HGB BLD-MCNC: 13.9 G/DL (ref 12–17)
LYMPHOCYTES # BLD AUTO: 1.35 THOUSANDS/ΜL (ref 0.6–4.47)
LYMPHOCYTES NFR BLD AUTO: 19 % (ref 14–44)
MCH RBC QN AUTO: 33.5 PG (ref 26.8–34.3)
MCHC RBC AUTO-ENTMCNC: 34.5 G/DL (ref 31.4–37.4)
MCV RBC AUTO: 97 FL (ref 82–98)
MONOCYTES # BLD AUTO: 0.56 THOUSAND/ΜL (ref 0.17–1.22)
MONOCYTES NFR BLD AUTO: 8 % (ref 4–12)
NEUTROPHILS # BLD AUTO: 4.19 THOUSANDS/ΜL (ref 1.85–7.62)
NEUTS SEG NFR BLD AUTO: 61 % (ref 43–75)
NRBC BLD AUTO-RTO: 0 /100 WBCS
PLATELET # BLD AUTO: 59 THOUSANDS/UL (ref 149–390)
PMV BLD AUTO: 12.1 FL (ref 8.9–12.7)
POTASSIUM SERPL-SCNC: 4.1 MMOL/L (ref 3.5–5.3)
PROT SERPL-MCNC: 7.5 G/DL (ref 6.4–8.2)
RBC # BLD AUTO: 4.15 MILLION/UL (ref 3.88–5.62)
SODIUM SERPL-SCNC: 138 MMOL/L (ref 136–145)
WBC # BLD AUTO: 6.97 THOUSAND/UL (ref 4.31–10.16)

## 2017-07-26 PROCEDURE — 85025 COMPLETE CBC W/AUTO DIFF WBC: CPT

## 2017-07-26 PROCEDURE — 80053 COMPREHEN METABOLIC PANEL: CPT

## 2017-07-26 PROCEDURE — 36415 COLL VENOUS BLD VENIPUNCTURE: CPT

## 2017-07-31 ENCOUNTER — ALLSCRIPTS OFFICE VISIT (OUTPATIENT)
Dept: OTHER | Facility: OTHER | Age: 67
End: 2017-07-31

## 2017-08-02 ENCOUNTER — ALLSCRIPTS OFFICE VISIT (OUTPATIENT)
Dept: OTHER | Facility: OTHER | Age: 67
End: 2017-08-02

## 2017-08-02 DIAGNOSIS — G89.3 NEOPLASM RELATED PAIN: ICD-10-CM

## 2017-08-02 DIAGNOSIS — C22.0 LIVER CELL CARCINOMA (HCC): ICD-10-CM

## 2017-08-02 DIAGNOSIS — B19.20 VIRAL HEPATITIS C WITHOUT HEPATIC COMA: ICD-10-CM

## 2017-08-03 ENCOUNTER — HOSPITAL ENCOUNTER (OUTPATIENT)
Dept: RADIOLOGY | Facility: HOSPITAL | Age: 67
Discharge: HOME | End: 2017-08-03
Attending: INTERNAL MEDICINE | Admitting: RADIOLOGY
Payer: MEDICARE

## 2017-08-03 ENCOUNTER — APPOINTMENT (OUTPATIENT)
Dept: LAB | Facility: HOSPITAL | Age: 67
End: 2017-08-03
Payer: MEDICARE

## 2017-08-03 ENCOUNTER — TRANSCRIBE ORDERS (OUTPATIENT)
Dept: LAB | Facility: HOSPITAL | Age: 67
End: 2017-08-03

## 2017-08-03 VITALS
DIASTOLIC BLOOD PRESSURE: 76 MMHG | OXYGEN SATURATION: 100 % | SYSTOLIC BLOOD PRESSURE: 126 MMHG | HEART RATE: 82 BPM | RESPIRATION RATE: 18 BRPM

## 2017-08-03 DIAGNOSIS — B19.20 VIRAL HEPATITIS C WITHOUT HEPATIC COMA: ICD-10-CM

## 2017-08-03 DIAGNOSIS — C22.0 LIVER CELL CARCINOMA (HCC): ICD-10-CM

## 2017-08-03 DIAGNOSIS — R18.8 OTHER ASCITES: ICD-10-CM

## 2017-08-03 DIAGNOSIS — G89.3 NEOPLASM RELATED PAIN: ICD-10-CM

## 2017-08-03 LAB
ALBUMIN SERPL BCP-MCNC: 2.3 G/DL (ref 3.5–5)
ALP SERPL-CCNC: 101 U/L (ref 46–116)
ALT SERPL W P-5'-P-CCNC: 43 U/L (ref 12–78)
ANION GAP SERPL CALCULATED.3IONS-SCNC: 7 MMOL/L (ref 4–13)
AST SERPL W P-5'-P-CCNC: 72 U/L (ref 5–45)
BASOPHILS # BLD AUTO: 0.04 THOUSANDS/ΜL (ref 0–0.1)
BASOPHILS NFR BLD AUTO: 1 % (ref 0–1)
BILIRUB SERPL-MCNC: 2.75 MG/DL (ref 0.2–1)
BUN SERPL-MCNC: 7 MG/DL (ref 5–25)
CALCIUM SERPL-MCNC: 7.9 MG/DL (ref 8.3–10.1)
CHLORIDE SERPL-SCNC: 105 MMOL/L (ref 100–108)
CO2 SERPL-SCNC: 26 MMOL/L (ref 21–32)
CREAT SERPL-MCNC: 0.85 MG/DL (ref 0.6–1.3)
EOSINOPHIL # BLD AUTO: 0.6 THOUSAND/ΜL (ref 0–0.61)
EOSINOPHIL NFR BLD AUTO: 9 % (ref 0–6)
ERYTHROCYTE [DISTWIDTH] IN BLOOD BY AUTOMATED COUNT: 13.9 % (ref 11.6–15.1)
GFR SERPL CREATININE-BSD FRML MDRD: 103 ML/MIN/1.73SQ M
GLUCOSE P FAST SERPL-MCNC: 129 MG/DL (ref 65–99)
HCT VFR BLD AUTO: 38.1 % (ref 36.5–49.3)
HGB BLD-MCNC: 13.1 G/DL (ref 12–17)
LYMPHOCYTES # BLD AUTO: 1.05 THOUSANDS/ΜL (ref 0.6–4.47)
LYMPHOCYTES NFR BLD AUTO: 15 % (ref 14–44)
MCH RBC QN AUTO: 33.1 PG (ref 26.8–34.3)
MCHC RBC AUTO-ENTMCNC: 34.4 G/DL (ref 31.4–37.4)
MCV RBC AUTO: 96 FL (ref 82–98)
MONOCYTES # BLD AUTO: 0.56 THOUSAND/ΜL (ref 0.17–1.22)
MONOCYTES NFR BLD AUTO: 8 % (ref 4–12)
NEUTROPHILS # BLD AUTO: 4.82 THOUSANDS/ΜL (ref 1.85–7.62)
NEUTS SEG NFR BLD AUTO: 67 % (ref 43–75)
NRBC BLD AUTO-RTO: 0 /100 WBCS
PLATELET # BLD AUTO: 47 THOUSANDS/UL (ref 149–390)
PMV BLD AUTO: 12.1 FL (ref 8.9–12.7)
POTASSIUM SERPL-SCNC: 3.7 MMOL/L (ref 3.5–5.3)
PROT SERPL-MCNC: 7.3 G/DL (ref 6.4–8.2)
RBC # BLD AUTO: 3.96 MILLION/UL (ref 3.88–5.62)
SODIUM SERPL-SCNC: 138 MMOL/L (ref 136–145)
WBC # BLD AUTO: 7.08 THOUSAND/UL (ref 4.31–10.16)

## 2017-08-03 PROCEDURE — 85025 COMPLETE CBC W/AUTO DIFF WBC: CPT

## 2017-08-03 PROCEDURE — 36415 COLL VENOUS BLD VENIPUNCTURE: CPT

## 2017-08-03 PROCEDURE — 82105 ALPHA-FETOPROTEIN SERUM: CPT

## 2017-08-03 PROCEDURE — 80053 COMPREHEN METABOLIC PANEL: CPT

## 2017-08-03 PROCEDURE — 49083 ABD PARACENTESIS W/IMAGING: CPT

## 2017-08-04 LAB — AFP-TM SERPL-MCNC: 17.9 NG/ML (ref 0–8.3)

## 2017-08-08 ENCOUNTER — HOSPITAL ENCOUNTER (INPATIENT)
Facility: HOSPITAL | Age: 67
LOS: 1 days | Discharge: HOME | DRG: 391 | End: 2017-08-10
Attending: EMERGENCY MEDICINE | Admitting: HOSPITALIST
Payer: MEDICARE

## 2017-08-08 ENCOUNTER — APPOINTMENT (EMERGENCY)
Dept: RADIOLOGY | Facility: HOSPITAL | Age: 67
DRG: 391 | End: 2017-08-08
Payer: MEDICARE

## 2017-08-08 DIAGNOSIS — R18.8 CIRRHOSIS OF LIVER WITH ASCITES, UNSPECIFIED HEPATIC CIRRHOSIS TYPE (HCC): ICD-10-CM

## 2017-08-08 DIAGNOSIS — K74.60 CIRRHOSIS OF LIVER WITH ASCITES, UNSPECIFIED HEPATIC CIRRHOSIS TYPE (HCC): ICD-10-CM

## 2017-08-08 DIAGNOSIS — K52.9 COLITIS: Primary | ICD-10-CM

## 2017-08-08 DIAGNOSIS — C22.0 HEPATOCELLULAR CARCINOMA (HCC): Chronic | ICD-10-CM

## 2017-08-08 DIAGNOSIS — R10.9 ABDOMINAL PAIN: ICD-10-CM

## 2017-08-08 DIAGNOSIS — R11.2 NAUSEA & VOMITING: ICD-10-CM

## 2017-08-08 LAB
ALBUMIN SERPL BCP-MCNC: 2.4 G/DL (ref 3.5–5)
ALP SERPL-CCNC: 120 U/L (ref 46–116)
ALT SERPL W P-5'-P-CCNC: 48 U/L (ref 12–78)
ANION GAP SERPL CALCULATED.3IONS-SCNC: 7 MMOL/L (ref 4–13)
AST SERPL W P-5'-P-CCNC: 90 U/L (ref 5–45)
BASOPHILS # BLD AUTO: 0.02 THOUSANDS/ΜL (ref 0–0.1)
BASOPHILS NFR BLD AUTO: 0 % (ref 0–1)
BILIRUB SERPL-MCNC: 2.35 MG/DL (ref 0.2–1)
BUN SERPL-MCNC: 6 MG/DL (ref 5–25)
CALCIUM SERPL-MCNC: 8.2 MG/DL (ref 8.3–10.1)
CHLORIDE SERPL-SCNC: 106 MMOL/L (ref 100–108)
CO2 SERPL-SCNC: 27 MMOL/L (ref 21–32)
CREAT SERPL-MCNC: 0.9 MG/DL (ref 0.6–1.3)
EOSINOPHIL # BLD AUTO: 0.09 THOUSAND/ΜL (ref 0–0.61)
EOSINOPHIL NFR BLD AUTO: 2 % (ref 0–6)
ERYTHROCYTE [DISTWIDTH] IN BLOOD BY AUTOMATED COUNT: 13.9 % (ref 11.6–15.1)
GFR SERPL CREATININE-BSD FRML MDRD: 101 ML/MIN/1.73SQ M
GLUCOSE SERPL-MCNC: 122 MG/DL (ref 65–140)
HCT VFR BLD AUTO: 38.6 % (ref 36.5–49.3)
HGB BLD-MCNC: 13.6 G/DL (ref 12–17)
LIPASE SERPL-CCNC: 180 U/L (ref 73–393)
LYMPHOCYTES # BLD AUTO: 0.88 THOUSANDS/ΜL (ref 0.6–4.47)
LYMPHOCYTES NFR BLD AUTO: 15 % (ref 14–44)
MCH RBC QN AUTO: 33.1 PG (ref 26.8–34.3)
MCHC RBC AUTO-ENTMCNC: 35.2 G/DL (ref 31.4–37.4)
MCV RBC AUTO: 94 FL (ref 82–98)
MONOCYTES # BLD AUTO: 0.5 THOUSAND/ΜL (ref 0.17–1.22)
MONOCYTES NFR BLD AUTO: 9 % (ref 4–12)
NEUTROPHILS # BLD AUTO: 4.3 THOUSANDS/ΜL (ref 1.85–7.62)
NEUTS SEG NFR BLD AUTO: 74 % (ref 43–75)
NRBC BLD AUTO-RTO: 0 /100 WBCS
PLATELET # BLD AUTO: 54 THOUSANDS/UL (ref 149–390)
PMV BLD AUTO: 12 FL (ref 8.9–12.7)
POTASSIUM SERPL-SCNC: 3.5 MMOL/L (ref 3.5–5.3)
PROT SERPL-MCNC: 7.8 G/DL (ref 6.4–8.2)
RBC # BLD AUTO: 4.11 MILLION/UL (ref 3.88–5.62)
SODIUM SERPL-SCNC: 140 MMOL/L (ref 136–145)
WBC # BLD AUTO: 5.8 THOUSAND/UL (ref 4.31–10.16)

## 2017-08-08 PROCEDURE — 80053 COMPREHEN METABOLIC PANEL: CPT | Performed by: EMERGENCY MEDICINE

## 2017-08-08 PROCEDURE — 96374 THER/PROPH/DIAG INJ IV PUSH: CPT

## 2017-08-08 PROCEDURE — 96375 TX/PRO/DX INJ NEW DRUG ADDON: CPT

## 2017-08-08 PROCEDURE — 96361 HYDRATE IV INFUSION ADD-ON: CPT

## 2017-08-08 PROCEDURE — 96376 TX/PRO/DX INJ SAME DRUG ADON: CPT

## 2017-08-08 PROCEDURE — 74177 CT ABD & PELVIS W/CONTRAST: CPT

## 2017-08-08 PROCEDURE — 85025 COMPLETE CBC W/AUTO DIFF WBC: CPT | Performed by: EMERGENCY MEDICINE

## 2017-08-08 PROCEDURE — 36415 COLL VENOUS BLD VENIPUNCTURE: CPT

## 2017-08-08 PROCEDURE — 83690 ASSAY OF LIPASE: CPT | Performed by: EMERGENCY MEDICINE

## 2017-08-08 PROCEDURE — 93005 ELECTROCARDIOGRAM TRACING: CPT

## 2017-08-08 RX ORDER — ONDANSETRON 2 MG/ML
4 INJECTION INTRAMUSCULAR; INTRAVENOUS ONCE AS NEEDED
Status: COMPLETED | OUTPATIENT
Start: 2017-08-08 | End: 2017-08-08

## 2017-08-08 RX ORDER — ONDANSETRON 2 MG/ML
INJECTION INTRAMUSCULAR; INTRAVENOUS
Status: COMPLETED
Start: 2017-08-08 | End: 2017-08-08

## 2017-08-08 RX ORDER — KETOROLAC TROMETHAMINE 30 MG/ML
15 INJECTION, SOLUTION INTRAMUSCULAR; INTRAVENOUS ONCE
Status: COMPLETED | OUTPATIENT
Start: 2017-08-08 | End: 2017-08-08

## 2017-08-08 RX ORDER — ONDANSETRON 2 MG/ML
4 INJECTION INTRAMUSCULAR; INTRAVENOUS ONCE
Status: COMPLETED | OUTPATIENT
Start: 2017-08-08 | End: 2017-08-08

## 2017-08-08 RX ADMIN — ONDANSETRON 4 MG: 2 INJECTION INTRAMUSCULAR; INTRAVENOUS at 22:51

## 2017-08-08 RX ADMIN — SODIUM CHLORIDE 1000 ML: 0.9 INJECTION, SOLUTION INTRAVENOUS at 23:59

## 2017-08-08 RX ADMIN — KETOROLAC TROMETHAMINE 15 MG: 30 INJECTION, SOLUTION INTRAMUSCULAR at 23:32

## 2017-08-08 RX ADMIN — IOHEXOL 100 ML: 350 INJECTION, SOLUTION INTRAVENOUS at 23:50

## 2017-08-08 RX ADMIN — ONDANSETRON 4 MG: 2 INJECTION INTRAMUSCULAR; INTRAVENOUS at 21:36

## 2017-08-08 RX ADMIN — SODIUM CHLORIDE 500 ML: 0.9 INJECTION, SOLUTION INTRAVENOUS at 22:52

## 2017-08-09 PROBLEM — D69.6 THROMBOCYTOPENIA (HCC): Chronic | Status: ACTIVE | Noted: 2017-08-09

## 2017-08-09 PROBLEM — R11.2 NAUSEA & VOMITING: Status: ACTIVE | Noted: 2017-08-09

## 2017-08-09 PROBLEM — D69.6 THROMBOCYTOPENIA (HCC): Status: ACTIVE | Noted: 2017-08-09

## 2017-08-09 PROBLEM — K76.6 PORTAL HYPERTENSION (HCC): Chronic | Status: ACTIVE | Noted: 2017-06-04

## 2017-08-09 PROBLEM — G89.3 CANCER RELATED PAIN: Chronic | Status: ACTIVE | Noted: 2017-06-04

## 2017-08-09 PROBLEM — C22.0 HEPATOCELLULAR CARCINOMA (HCC): Chronic | Status: ACTIVE | Noted: 2017-06-04

## 2017-08-09 PROBLEM — K52.9 COLITIS PRESUMED INFECTIOUS: Chronic | Status: ACTIVE | Noted: 2017-06-04

## 2017-08-09 LAB
ANION GAP SERPL CALCULATED.3IONS-SCNC: 6 MMOL/L (ref 4–13)
ATRIAL RATE: 147 BPM
ATRIAL RATE: 78 BPM
BUN SERPL-MCNC: 5 MG/DL (ref 5–25)
CALCIUM SERPL-MCNC: 7.4 MG/DL (ref 8.3–10.1)
CHLORIDE SERPL-SCNC: 107 MMOL/L (ref 100–108)
CO2 SERPL-SCNC: 26 MMOL/L (ref 21–32)
CREAT SERPL-MCNC: 0.67 MG/DL (ref 0.6–1.3)
ERYTHROCYTE [DISTWIDTH] IN BLOOD BY AUTOMATED COUNT: 14.2 % (ref 11.6–15.1)
GFR SERPL CREATININE-BSD FRML MDRD: 114 ML/MIN/1.73SQ M
GLUCOSE SERPL-MCNC: 133 MG/DL (ref 65–140)
HCT VFR BLD AUTO: 34.1 % (ref 36.5–49.3)
HGB BLD-MCNC: 12.3 G/DL (ref 12–17)
MCH RBC QN AUTO: 33.6 PG (ref 26.8–34.3)
MCHC RBC AUTO-ENTMCNC: 36.1 G/DL (ref 31.4–37.4)
MCV RBC AUTO: 93 FL (ref 82–98)
P AXIS: 72 DEGREES
P AXIS: 87 DEGREES
PLATELET # BLD AUTO: 44 THOUSANDS/UL (ref 149–390)
PMV BLD AUTO: 11.7 FL (ref 8.9–12.7)
POTASSIUM SERPL-SCNC: 3.2 MMOL/L (ref 3.5–5.3)
PR INTERVAL: 150 MS
QRS AXIS: -17 DEGREES
QRS AXIS: -23 DEGREES
QRSD INTERVAL: 100 MS
QRSD INTERVAL: 100 MS
QT INTERVAL: 280 MS
QT INTERVAL: 420 MS
QTC INTERVAL: 412 MS
QTC INTERVAL: 478 MS
RBC # BLD AUTO: 3.66 MILLION/UL (ref 3.88–5.62)
SODIUM SERPL-SCNC: 139 MMOL/L (ref 136–145)
T WAVE AXIS: 123 DEGREES
T WAVE AXIS: 43 DEGREES
VENTRICULAR RATE: 130 BPM
VENTRICULAR RATE: 78 BPM
WBC # BLD AUTO: 6.27 THOUSAND/UL (ref 4.31–10.16)

## 2017-08-09 PROCEDURE — 85027 COMPLETE CBC AUTOMATED: CPT | Performed by: HOSPITALIST

## 2017-08-09 PROCEDURE — 80048 BASIC METABOLIC PNL TOTAL CA: CPT | Performed by: HOSPITALIST

## 2017-08-09 PROCEDURE — 99285 EMERGENCY DEPT VISIT HI MDM: CPT

## 2017-08-09 RX ORDER — OXYCODONE HYDROCHLORIDE 5 MG/1
TABLET ORAL EVERY 4 HOURS
Status: DISCONTINUED | OUTPATIENT
Start: 2017-08-09 | End: 2017-08-11 | Stop reason: HOSPADM

## 2017-08-09 RX ORDER — FENTANYL 12 UG/H
1 PATCH TRANSDERMAL
Status: DISCONTINUED | OUTPATIENT
Start: 2017-08-09 | End: 2017-08-09

## 2017-08-09 RX ORDER — MORPHINE SULFATE 4 MG/ML
4 INJECTION, SOLUTION INTRAMUSCULAR; INTRAVENOUS ONCE
Status: COMPLETED | OUTPATIENT
Start: 2017-08-09 | End: 2017-08-09

## 2017-08-09 RX ORDER — METHYLPHENIDATE HYDROCHLORIDE 10 MG/1
10 TABLET ORAL DAILY
Status: DISCONTINUED | OUTPATIENT
Start: 2017-08-09 | End: 2017-08-11 | Stop reason: HOSPADM

## 2017-08-09 RX ORDER — MAGNESIUM HYDROXIDE/ALUMINUM HYDROXICE/SIMETHICONE 120; 1200; 1200 MG/30ML; MG/30ML; MG/30ML
30 SUSPENSION ORAL EVERY 6 HOURS PRN
Status: DISCONTINUED | OUTPATIENT
Start: 2017-08-09 | End: 2017-08-11 | Stop reason: HOSPADM

## 2017-08-09 RX ORDER — FENTANYL 12 UG/H
1 PATCH TRANSDERMAL
Status: DISCONTINUED | OUTPATIENT
Start: 2017-08-11 | End: 2017-08-11 | Stop reason: HOSPADM

## 2017-08-09 RX ORDER — CIPROFLOXACIN 2 MG/ML
400 INJECTION, SOLUTION INTRAVENOUS EVERY 12 HOURS
Status: DISCONTINUED | OUTPATIENT
Start: 2017-08-09 | End: 2017-08-09 | Stop reason: ALTCHOICE

## 2017-08-09 RX ORDER — CIPROFLOXACIN 2 MG/ML
400 INJECTION, SOLUTION INTRAVENOUS ONCE
Status: COMPLETED | OUTPATIENT
Start: 2017-08-09 | End: 2017-08-09

## 2017-08-09 RX ORDER — POLYETHYLENE GLYCOL 3350 17 G/17G
17 POWDER, FOR SOLUTION ORAL DAILY
Status: DISCONTINUED | OUTPATIENT
Start: 2017-08-09 | End: 2017-08-11 | Stop reason: HOSPADM

## 2017-08-09 RX ORDER — SODIUM CHLORIDE 9 MG/ML
70 INJECTION, SOLUTION INTRAVENOUS CONTINUOUS
Status: DISCONTINUED | OUTPATIENT
Start: 2017-08-09 | End: 2017-08-09

## 2017-08-09 RX ORDER — ACETAMINOPHEN 325 MG/1
650 TABLET ORAL EVERY 6 HOURS PRN
Status: DISCONTINUED | OUTPATIENT
Start: 2017-08-09 | End: 2017-08-11 | Stop reason: HOSPADM

## 2017-08-09 RX ORDER — SODIUM CHLORIDE 9 MG/ML
60 INJECTION, SOLUTION INTRAVENOUS CONTINUOUS
Status: DISCONTINUED | OUTPATIENT
Start: 2017-08-09 | End: 2017-08-10

## 2017-08-09 RX ORDER — ONDANSETRON 2 MG/ML
4 INJECTION INTRAMUSCULAR; INTRAVENOUS EVERY 6 HOURS PRN
Status: DISCONTINUED | OUTPATIENT
Start: 2017-08-09 | End: 2017-08-11 | Stop reason: HOSPADM

## 2017-08-09 RX ORDER — SORAFENIB 200 MG/1
400 TABLET, FILM COATED ORAL EVERY EVENING
Status: DISCONTINUED | OUTPATIENT
Start: 2017-08-09 | End: 2017-08-09

## 2017-08-09 RX ORDER — SORAFENIB 200 MG/1
400 TABLET, FILM COATED ORAL EVERY MORNING
Status: DISCONTINUED | OUTPATIENT
Start: 2017-08-09 | End: 2017-08-11 | Stop reason: HOSPADM

## 2017-08-09 RX ORDER — FUROSEMIDE 40 MG/1
40 TABLET ORAL DAILY
Status: DISCONTINUED | OUTPATIENT
Start: 2017-08-09 | End: 2017-08-11 | Stop reason: HOSPADM

## 2017-08-09 RX ORDER — SORAFENIB 200 MG/1
200 TABLET, FILM COATED ORAL EVERY EVENING
Status: DISCONTINUED | OUTPATIENT
Start: 2017-08-09 | End: 2017-08-11 | Stop reason: HOSPADM

## 2017-08-09 RX ORDER — POTASSIUM CHLORIDE 20 MEQ/1
40 TABLET, EXTENDED RELEASE ORAL ONCE
Status: COMPLETED | OUTPATIENT
Start: 2017-08-09 | End: 2017-08-09

## 2017-08-09 RX ORDER — CIPROFLOXACIN 500 MG/1
500 TABLET, FILM COATED ORAL EVERY 12 HOURS SCHEDULED
Status: DISCONTINUED | OUTPATIENT
Start: 2017-08-09 | End: 2017-08-09

## 2017-08-09 RX ORDER — METRONIDAZOLE 500 MG/1
500 TABLET ORAL EVERY 8 HOURS SCHEDULED
Status: DISCONTINUED | OUTPATIENT
Start: 2017-08-09 | End: 2017-08-09

## 2017-08-09 RX ORDER — SPIRONOLACTONE 50 MG/1
50 TABLET, FILM COATED ORAL 2 TIMES DAILY
Status: DISCONTINUED | OUTPATIENT
Start: 2017-08-09 | End: 2017-08-11 | Stop reason: HOSPADM

## 2017-08-09 RX ORDER — NORTRIPTYLINE HYDROCHLORIDE 25 MG/1
25 CAPSULE ORAL
Status: DISCONTINUED | OUTPATIENT
Start: 2017-08-09 | End: 2017-08-11 | Stop reason: HOSPADM

## 2017-08-09 RX ADMIN — OXYCODONE HYDROCHLORIDE 5 MG: 5 TABLET ORAL at 17:14

## 2017-08-09 RX ADMIN — FUROSEMIDE 40 MG: 40 TABLET ORAL at 10:07

## 2017-08-09 RX ADMIN — OXYCODONE HYDROCHLORIDE 5 MG: 5 TABLET ORAL at 10:06

## 2017-08-09 RX ADMIN — SODIUM CHLORIDE 70 ML/HR: 0.9 INJECTION, SOLUTION INTRAVENOUS at 05:12

## 2017-08-09 RX ADMIN — METHYLPHENIDATE HYDROCHLORIDE 10 MG: 5 TABLET ORAL at 10:07

## 2017-08-09 RX ADMIN — SPIRONOLACTONE 50 MG: 50 TABLET ORAL at 17:14

## 2017-08-09 RX ADMIN — MORPHINE SULFATE 4 MG: 4 INJECTION, SOLUTION INTRAMUSCULAR; INTRAVENOUS at 01:32

## 2017-08-09 RX ADMIN — POTASSIUM CHLORIDE 40 MEQ: 1500 TABLET, EXTENDED RELEASE ORAL at 10:08

## 2017-08-09 RX ADMIN — OXYCODONE HYDROCHLORIDE 5 MG: 5 TABLET ORAL at 13:32

## 2017-08-09 RX ADMIN — METRONIDAZOLE 500 MG: 500 INJECTION, SOLUTION INTRAVENOUS at 01:33

## 2017-08-09 RX ADMIN — SODIUM CHLORIDE 60 ML/HR: 0.9 INJECTION, SOLUTION INTRAVENOUS at 09:43

## 2017-08-09 RX ADMIN — CIPROFLOXACIN 400 MG: 2 INJECTION, SOLUTION INTRAVENOUS at 07:44

## 2017-08-09 RX ADMIN — SORAFENIB 400 MG: 200 TABLET, FILM COATED ORAL at 13:33

## 2017-08-09 RX ADMIN — CIPROFLOXACIN 400 MG: 2 INJECTION, SOLUTION INTRAVENOUS at 02:11

## 2017-08-09 RX ADMIN — SPIRONOLACTONE 50 MG: 50 TABLET ORAL at 10:06

## 2017-08-09 RX ADMIN — NORTRIPTYLINE HYDROCHLORIDE 25 MG: 25 CAPSULE ORAL at 21:42

## 2017-08-09 RX ADMIN — SORAFENIB 200 MG: 200 TABLET, FILM COATED ORAL at 17:15

## 2017-08-09 RX ADMIN — METRONIDAZOLE 500 MG: 500 INJECTION, SOLUTION INTRAVENOUS at 05:12

## 2017-08-09 RX ADMIN — OXYCODONE HYDROCHLORIDE 5 MG: 5 TABLET ORAL at 21:42

## 2017-08-10 VITALS
BODY MASS INDEX: 21.7 KG/M2 | HEIGHT: 71 IN | OXYGEN SATURATION: 98 % | WEIGHT: 155 LBS | TEMPERATURE: 97.7 F | SYSTOLIC BLOOD PRESSURE: 107 MMHG | HEART RATE: 72 BPM | RESPIRATION RATE: 20 BRPM | DIASTOLIC BLOOD PRESSURE: 76 MMHG

## 2017-08-10 LAB
ANION GAP SERPL CALCULATED.3IONS-SCNC: 6 MMOL/L (ref 4–13)
BUN SERPL-MCNC: 6 MG/DL (ref 5–25)
CALCIUM SERPL-MCNC: 8 MG/DL (ref 8.3–10.1)
CHLORIDE SERPL-SCNC: 107 MMOL/L (ref 100–108)
CO2 SERPL-SCNC: 28 MMOL/L (ref 21–32)
CREAT SERPL-MCNC: 0.88 MG/DL (ref 0.6–1.3)
ERYTHROCYTE [DISTWIDTH] IN BLOOD BY AUTOMATED COUNT: 14.2 % (ref 11.6–15.1)
GFR SERPL CREATININE-BSD FRML MDRD: 102 ML/MIN/1.73SQ M
GLUCOSE SERPL-MCNC: 72 MG/DL (ref 65–140)
GLUCOSE SERPL-MCNC: 83 MG/DL (ref 65–140)
HCT VFR BLD AUTO: 37.2 % (ref 36.5–49.3)
HGB BLD-MCNC: 13.2 G/DL (ref 12–17)
MCH RBC QN AUTO: 33.3 PG (ref 26.8–34.3)
MCHC RBC AUTO-ENTMCNC: 35.5 G/DL (ref 31.4–37.4)
MCV RBC AUTO: 94 FL (ref 82–98)
PLATELET # BLD AUTO: 47 THOUSANDS/UL (ref 149–390)
PMV BLD AUTO: 11.6 FL (ref 8.9–12.7)
POTASSIUM SERPL-SCNC: 3.3 MMOL/L (ref 3.5–5.3)
RBC # BLD AUTO: 3.96 MILLION/UL (ref 3.88–5.62)
SODIUM SERPL-SCNC: 141 MMOL/L (ref 136–145)
WBC # BLD AUTO: 6.79 THOUSAND/UL (ref 4.31–10.16)

## 2017-08-10 PROCEDURE — 80048 BASIC METABOLIC PNL TOTAL CA: CPT | Performed by: PHYSICIAN ASSISTANT

## 2017-08-10 PROCEDURE — 85027 COMPLETE CBC AUTOMATED: CPT | Performed by: PHYSICIAN ASSISTANT

## 2017-08-10 PROCEDURE — 82948 REAGENT STRIP/BLOOD GLUCOSE: CPT

## 2017-08-10 RX ORDER — POTASSIUM CHLORIDE 20 MEQ/1
20 TABLET, EXTENDED RELEASE ORAL ONCE
Status: COMPLETED | OUTPATIENT
Start: 2017-08-10 | End: 2017-08-10

## 2017-08-10 RX ADMIN — OXYCODONE HYDROCHLORIDE 5 MG: 5 TABLET ORAL at 00:33

## 2017-08-10 RX ADMIN — POTASSIUM CHLORIDE 20 MEQ: 1500 TABLET, EXTENDED RELEASE ORAL at 12:19

## 2017-08-10 RX ADMIN — SODIUM CHLORIDE 60 ML/HR: 0.9 INJECTION, SOLUTION INTRAVENOUS at 00:27

## 2017-08-10 RX ADMIN — OXYCODONE HYDROCHLORIDE 5 MG: 5 TABLET ORAL at 09:38

## 2017-08-10 RX ADMIN — FUROSEMIDE 40 MG: 40 TABLET ORAL at 08:45

## 2017-08-10 RX ADMIN — SPIRONOLACTONE 50 MG: 50 TABLET ORAL at 08:45

## 2017-08-10 RX ADMIN — METHYLPHENIDATE HYDROCHLORIDE 10 MG: 5 TABLET ORAL at 08:45

## 2017-08-10 RX ADMIN — SORAFENIB 400 MG: 200 TABLET, FILM COATED ORAL at 08:48

## 2017-08-10 RX ADMIN — OXYCODONE HYDROCHLORIDE: 5 TABLET ORAL at 05:59

## 2017-08-11 ENCOUNTER — HOSPITAL ENCOUNTER (EMERGENCY)
Facility: HOSPITAL | Age: 67
Discharge: HOME | End: 2017-08-11
Attending: EMERGENCY MEDICINE | Admitting: EMERGENCY MEDICINE
Payer: MEDICARE

## 2017-08-11 VITALS
HEART RATE: 90 BPM | SYSTOLIC BLOOD PRESSURE: 122 MMHG | HEIGHT: 71 IN | RESPIRATION RATE: 16 BRPM | DIASTOLIC BLOOD PRESSURE: 78 MMHG | OXYGEN SATURATION: 97 % | TEMPERATURE: 98.8 F

## 2017-08-11 DIAGNOSIS — R11.0 NAUSEA: Primary | ICD-10-CM

## 2017-08-11 DIAGNOSIS — R10.9 ABDOMINAL PAIN: ICD-10-CM

## 2017-08-11 LAB
ALBUMIN SERPL BCP-MCNC: 2.3 G/DL (ref 3.5–5)
ALP SERPL-CCNC: 103 U/L (ref 46–116)
ALT SERPL W P-5'-P-CCNC: 50 U/L (ref 12–78)
ANION GAP SERPL CALCULATED.3IONS-SCNC: 9 MMOL/L (ref 4–13)
APTT PPP: 38 SECONDS (ref 23–35)
AST SERPL W P-5'-P-CCNC: 97 U/L (ref 5–45)
ATRIAL RATE: 92 BPM
BASOPHILS # BLD AUTO: 0.01 THOUSANDS/ΜL (ref 0–0.1)
BASOPHILS NFR BLD AUTO: 0 % (ref 0–1)
BILIRUB SERPL-MCNC: 2.61 MG/DL (ref 0.2–1)
BUN SERPL-MCNC: 7 MG/DL (ref 5–25)
CALCIUM SERPL-MCNC: 8.3 MG/DL (ref 8.3–10.1)
CHLORIDE SERPL-SCNC: 103 MMOL/L (ref 100–108)
CO2 SERPL-SCNC: 23 MMOL/L (ref 21–32)
CREAT SERPL-MCNC: 1.08 MG/DL (ref 0.6–1.3)
EOSINOPHIL # BLD AUTO: 0.01 THOUSAND/ΜL (ref 0–0.61)
EOSINOPHIL NFR BLD AUTO: 0 % (ref 0–6)
ERYTHROCYTE [DISTWIDTH] IN BLOOD BY AUTOMATED COUNT: 14.3 % (ref 11.6–15.1)
GFR SERPL CREATININE-BSD FRML MDRD: 81 ML/MIN/1.73SQ M
GLUCOSE SERPL-MCNC: 112 MG/DL (ref 65–140)
HCT VFR BLD AUTO: 41.8 % (ref 36.5–49.3)
HGB BLD-MCNC: 14.9 G/DL (ref 12–17)
INR PPP: 1.95 (ref 0.86–1.16)
LIPASE SERPL-CCNC: 136 U/L (ref 73–393)
LYMPHOCYTES # BLD AUTO: 0.77 THOUSANDS/ΜL (ref 0.6–4.47)
LYMPHOCYTES NFR BLD AUTO: 10 % (ref 14–44)
MCH RBC QN AUTO: 34 PG (ref 26.8–34.3)
MCHC RBC AUTO-ENTMCNC: 35.6 G/DL (ref 31.4–37.4)
MCV RBC AUTO: 95 FL (ref 82–98)
MONOCYTES # BLD AUTO: 0.65 THOUSAND/ΜL (ref 0.17–1.22)
MONOCYTES NFR BLD AUTO: 9 % (ref 4–12)
NEUTROPHILS # BLD AUTO: 6.03 THOUSANDS/ΜL (ref 1.85–7.62)
NEUTS SEG NFR BLD AUTO: 81 % (ref 43–75)
NRBC BLD AUTO-RTO: 0 /100 WBCS
P AXIS: 48 DEGREES
PLATELET # BLD AUTO: 50 THOUSANDS/UL (ref 149–390)
PMV BLD AUTO: 11.8 FL (ref 8.9–12.7)
POTASSIUM SERPL-SCNC: 5.2 MMOL/L (ref 3.5–5.3)
PR INTERVAL: 138 MS
PROT SERPL-MCNC: 8.4 G/DL (ref 6.4–8.2)
PROTHROMBIN TIME: 22.4 SECONDS (ref 12.1–14.4)
QRS AXIS: -40 DEGREES
QRSD INTERVAL: 86 MS
QT INTERVAL: 364 MS
QTC INTERVAL: 450 MS
RBC # BLD AUTO: 4.38 MILLION/UL (ref 3.88–5.62)
SODIUM SERPL-SCNC: 135 MMOL/L (ref 136–145)
T WAVE AXIS: 20 DEGREES
VENTRICULAR RATE: 92 BPM
WBC # BLD AUTO: 7.49 THOUSAND/UL (ref 4.31–10.16)

## 2017-08-11 PROCEDURE — 36415 COLL VENOUS BLD VENIPUNCTURE: CPT | Performed by: STUDENT IN AN ORGANIZED HEALTH CARE EDUCATION/TRAINING PROGRAM

## 2017-08-11 PROCEDURE — 99284 EMERGENCY DEPT VISIT MOD MDM: CPT

## 2017-08-11 PROCEDURE — 85730 THROMBOPLASTIN TIME PARTIAL: CPT | Performed by: STUDENT IN AN ORGANIZED HEALTH CARE EDUCATION/TRAINING PROGRAM

## 2017-08-11 PROCEDURE — 96361 HYDRATE IV INFUSION ADD-ON: CPT

## 2017-08-11 PROCEDURE — 83690 ASSAY OF LIPASE: CPT | Performed by: EMERGENCY MEDICINE

## 2017-08-11 PROCEDURE — 96375 TX/PRO/DX INJ NEW DRUG ADDON: CPT

## 2017-08-11 PROCEDURE — 85610 PROTHROMBIN TIME: CPT | Performed by: STUDENT IN AN ORGANIZED HEALTH CARE EDUCATION/TRAINING PROGRAM

## 2017-08-11 PROCEDURE — 93005 ELECTROCARDIOGRAM TRACING: CPT

## 2017-08-11 PROCEDURE — 80053 COMPREHEN METABOLIC PANEL: CPT | Performed by: EMERGENCY MEDICINE

## 2017-08-11 PROCEDURE — 85025 COMPLETE CBC W/AUTO DIFF WBC: CPT | Performed by: EMERGENCY MEDICINE

## 2017-08-11 PROCEDURE — 96374 THER/PROPH/DIAG INJ IV PUSH: CPT

## 2017-08-11 RX ORDER — ONDANSETRON 2 MG/ML
4 INJECTION INTRAMUSCULAR; INTRAVENOUS ONCE
Status: COMPLETED | OUTPATIENT
Start: 2017-08-11 | End: 2017-08-11

## 2017-08-11 RX ORDER — ONDANSETRON 4 MG/1
4 TABLET, FILM COATED ORAL EVERY 6 HOURS
Qty: 12 TABLET | Refills: 0 | Status: SHIPPED | OUTPATIENT
Start: 2017-08-11

## 2017-08-11 RX ORDER — ONDANSETRON 2 MG/ML
4 INJECTION INTRAMUSCULAR; INTRAVENOUS ONCE
Status: DISCONTINUED | OUTPATIENT
Start: 2017-08-11 | End: 2017-08-11 | Stop reason: HOSPADM

## 2017-08-11 RX ADMIN — SODIUM CHLORIDE 500 ML: 0.9 INJECTION, SOLUTION INTRAVENOUS at 15:38

## 2017-08-11 RX ADMIN — ONDANSETRON 4 MG: 2 INJECTION INTRAMUSCULAR; INTRAVENOUS at 14:36

## 2017-08-11 RX ADMIN — HYDROMORPHONE HYDROCHLORIDE 0.5 MG: 1 INJECTION, SOLUTION INTRAMUSCULAR; INTRAVENOUS; SUBCUTANEOUS at 15:32

## 2017-08-15 ENCOUNTER — GENERIC CONVERSION - ENCOUNTER (OUTPATIENT)
Dept: OTHER | Facility: OTHER | Age: 67
End: 2017-08-15

## 2017-08-22 DIAGNOSIS — C22.0 LIVER CELL CARCINOMA (HCC): ICD-10-CM

## 2017-08-25 ENCOUNTER — GENERIC CONVERSION - ENCOUNTER (OUTPATIENT)
Dept: OTHER | Facility: OTHER | Age: 67
End: 2017-08-25

## 2017-08-30 ENCOUNTER — GENERIC CONVERSION - ENCOUNTER (OUTPATIENT)
Dept: OTHER | Facility: OTHER | Age: 67
End: 2017-08-30

## 2017-08-31 ENCOUNTER — ALLSCRIPTS OFFICE VISIT (OUTPATIENT)
Dept: OTHER | Facility: OTHER | Age: 67
End: 2017-08-31

## 2017-08-31 ENCOUNTER — HOSPITAL ENCOUNTER (OUTPATIENT)
Dept: RADIOLOGY | Facility: HOSPITAL | Age: 67
Discharge: HOME | End: 2017-08-31
Attending: INTERNAL MEDICINE
Payer: MEDICARE

## 2017-08-31 VITALS — DIASTOLIC BLOOD PRESSURE: 70 MMHG | OXYGEN SATURATION: 1 % | HEART RATE: 70 BPM | SYSTOLIC BLOOD PRESSURE: 122 MMHG

## 2017-08-31 DIAGNOSIS — R18.8 ASCITES: ICD-10-CM

## 2017-08-31 PROCEDURE — 49083 ABD PARACENTESIS W/IMAGING: CPT

## 2017-09-05 ENCOUNTER — ALLSCRIPTS OFFICE VISIT (OUTPATIENT)
Dept: OTHER | Facility: OTHER | Age: 67
End: 2017-09-05

## 2017-09-05 ENCOUNTER — GENERIC CONVERSION - ENCOUNTER (OUTPATIENT)
Dept: OTHER | Facility: OTHER | Age: 67
End: 2017-09-05

## 2017-10-25 NOTE — PROGRESS NOTES
Assessment  1  Dysgeusia (781 1) (R43 2)   2  Cancer related pain (338 3) (G89 3)   3  Ascites (789 59) (R18 8)   4  Hepatocellular carcinoma (155 0) (C22 0)    Plan  Abdominal pain, RUQ, Cancer related pain, Post-thoracotomy pain    · FentaNYL 25 MCG/HR Transdermal Patch 72 Hour; APPLY 1 PATCH EVERY 3  DAYS   Rx By: Gurpreet Vela; Dispense: 30 Days ; #:10 Patch 72 Hour; Refill: 0;For: Abdominal pain, RUQ, Cancer related pain, Post-thoracotomy pain; EDGAR = N; Print Rx  Cancer related pain, Difficulty sleeping    · DiazePAM 2 MG Oral Tablet; TAKE 1 TABLET AT BEDTIME   Rx By: Gurpreet Vela; Dispense: 30 Days ; #:30 Tablet; Refill: 0;For: Cancer related pain, Difficulty sleeping; EDGAR = N; Print Rx  Cancer related pain, Post-thoracotomy pain    · OxyCODONE HCl - 5 MG Oral Tablet; Take 1 tablet every 6 hours as needed for  pain   Rx By: Gurpreet Vela; Dispense: 30 Days ; #:120 Tablet; Refill: 0;For: Cancer related pain, Post-thoracotomy pain; EDGAR = N; Print Rx  Dysgeusia    · Nystatin 118123 UNIT/ML Mouth/Throat Suspension; SWISH AND SWALLOW 5ML  4 TIMES DAILY  Use for 2 days after symptoms resolve   Rx By: Gurpreet Vela; Dispense: 15 Days ; #:300 ML; Refill: 1;For: Dysgeusia; EDGAR = N; Verified Transmission to [address]; Last Updated By: System, [employer]; 8/31/2017 11:06:47 AM  Fatigue    · Methylphenidate HCl - 5 MG Oral Tablet; 1 tablet daily as needed for fatigue  Do  not take after 1pm   Rx By: Gurpreet Vela; Dispense: 30 Days ; #:30 Tablet; Refill: 0;For: Fatigue; EDGAR = N; Print Rx  Oral candidiasis    · Fluconazole 100 MG Oral Tablet   Rx By: Gurpreet Vela; Dispense: 10 Days ; #:11 Tablet; Refill: 0;For: Oral candidiasis; EDGAR = N; Sent To: [facility]    Discussion/Summary  Discussion Summary:   Cancer related pain - cont fentanyl to 25mcg and oxy PRN  with sleep - Continue use of valium as needed  - resolved but pt with residual symptoms   Start nystatin swish and spit for maintanence  - follow up with med/onc  Externally disease appears to be progessing with muscle wasting and swelling  Goals and Barriers: The patient has the current Goals: Weight gain, improved muscle mass  Continued pain relief  The patent has the current Barriers: [address] , ongoing cancer treatment  Patient's Capacity to Self-Care: Patient is able to Self-Care  Medication SE Review and Pt Understands Tx: Possible side effects of new medications were reviewed with the patient/guardian today  Self Referrals:   Self Referrals: No      Chief Complaint  Chief Complaint Free Text Note Form: Palliative care Follow up  History of Present Illness  HPI: 65yo male with [address]  He remains on nexavar - on protocol for another 3 months  He is trying to regulate his bowels  He is going to IR after this appointment for a paracentesis  In addition to his belly swelling his legs are getting big too  He tries to balance staying hydrated and putting on too much fluid  Things don't taste very good to him lately  His pain is bothering him some however he is still getting good sleep with the valium  He continues to take about 4 oxycodone throughout the day  Review of Systems  Complete-Male:   Constitutional: feeling tired, but-- No fever or chills, feels well, no tiredness, no recent weight gain or weight loss  Eyes: No complaints of eye pain, no red eyes, no discharge from eyes, no itchy eyes  ENT: no complaints of earache, no hearing loss, no nosebleeds, no nasal discharge, no sore throat, no hoarseness  Cardiovascular: No complaints of slow heart rate, no fast heart rate, no chest pain, no palpitations, no leg claudication, no lower extremity  Respiratory: shortness of breath during exertion, but-- No complaints of shortness of breath, no wheezing, no cough, no SOB on exertion, no orthopnea or PND     Gastrointestinal: abdominal pain, but-- No complaints of abdominal pain, no constipation, no nausea or vomiting, no diarrhea or bloody stools  Genitourinary: No complaints of dysuria, no incontinence, no hesitancy, no nocturia, no genital lesion, no testicular pain  Musculoskeletal: limb swelling, but-- No complaints of arthralgia, no myalgias, no joint swelling or stiffness, no limb pain or swelling  Integumentary: No complaints of skin rash or skin lesions, no itching, no skin wound, no dry skin  Neurological: difficulty walking, but-- No compliants of headache, no confusion, no convulsions, no numbness or tingling, no dizziness or fainting, no limb weakness, no difficulty walking  Psychiatric: sleep disturbances, but-- Is not suicidal, no sleep disturbances, no anxiety or depression, no change in personality, no emotional problems  Endocrine: muscle weakness, but-- No complaints of proptosis, no hot flashes, no muscle weakness, no erectile dysfunction, no deepening of the voice, no feelings of weakness  Hematologic/Lymphatic: No complaints of swollen glands, no swollen glands in the neck, does not bleed easily, no easy bruising  Active Problems  1  Abdominal pain, RUQ (789 01) (R10 11)   2  Abnormal weight loss (783 21) (R63 4)   3  Ascites (789 59) (R18 8)   4  Bloating (787 3) (R14 0)   5  Cancer related pain (338 3) (G89 3)   6  Difficulty sleeping (780 50) (G47 9)   7  Extensor tendon laceration of finger with open wound, subsequent encounter (V58 89)   (S66 529D,S61 209D)   8  Family history of colon cancer (V16 0) (Z80 0)   9  Fatigue (780 79) (R53 83)   10  [place] disease (277 4) (E80 4)   11  Hepatitis C (070 70) (B19 20)   12  Hepatocellular carcinoma (155 0) (C22 0)   13  History of colon polyps (V12 72) (Z86 010)   14  Liver cirrhosis (571 5) (K74 60)   15  Liver disease (573 9) (K76 9)   16  Lung nodule (793 11) (R91 1)   17  Malignant neoplasm metastatic to lung, unspecified laterality (197 0) (C78 00)   18  Oral candidiasis (112 0) (B37 0)   19   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)   20  Spleen laceration (865 09) (S36 039A)   21  Thrombocytopenia (287 5) (D69 6)    Past Medical History  1  History of Gilbert's disease (277 4) (E80 4)    Surgical History  1  History of Biopsy Of Liver   2  History of Hand Incision Tendon Sheath Of A Finger   3  History of Hernia Repair   4  History of Tonsillectomy   5  History of Wedge Resection Of Lung    Family History  Father    1  Family history of lung cancer (V16 1) (Z80 1)  Brother    2  Family history of Colon carcinoma  Grandfather    3  Family history of Colon carcinoma    Social History   · Denied: History of Alcohol use   · Always uses seat belt   ·    · Drug use (305 90) (F19 90)   · Former smoker (V15 82) (Z87 891)   · No alcohol use   · Previous  service   · Retired  Social History Reviewed: The social history was reviewed and updated today  Current Meds   1  DiazePAM 2 MG Oral Tablet; TAKE 1 TABLET AT BEDTIME; Therapy: 16RIY2285 to (Evaluate:08Jzm2682); Last Rx:55Pnv0865 Ordered   2  FentaNYL 25 MCG/HR Transdermal Patch 72 Hour; APPLY 1 PATCH EVERY 3 DAYS; Therapy: 63JAE3045 to (Evaluate:38Eff3408); Last Rx:97Kgr5925 Ordered   3  Fluconazole 100 MG Oral Tablet; TAKE 2 TABLETS ON DAY 1, THEN 1 TABLET DAILY   until gone; Therapy: 87NLW4804 to (Evaluate:10Aug2017)  Requested for: 51NGX9738; Last   Rx:86Xnh9189 Ordered   4  Furosemide 40 MG Oral Tablet; TAKE 1 TABLET DAILY AS DIRECTED; Therapy: 64UIM5716 to (Evaluate:31Coe7299)  Requested for: 95Cnu6174; Last   Rx:11Apr2017 Ordered   5  Lidocaine 5 % External Ointment; APPLY TO PAINFUL AREAS on left later rib cage 2 TO 3   TIMES DAILY AS NEEDED; Therapy: 86LXF2508 to (Evaluate:55Qvq3166); Last Rx:30Nov2016 Ordered   6  Methylphenidate HCl - 5 MG Oral Tablet; 1 tablet daily as needed for fatigue  Do not take   after 1pm;   Therapy: 17HVK6686 to (Evaluate:73Zzh4473); Last Rx:67Jit5696 Ordered   7  Multi-Vitamin Oral Tablet Recorded   8   NexAVAR 200 MG Oral Tablet; 2 tabs in am , one tab in pm;   Therapy: 65UJP6250 to (Last Rx:28Mar2017)  Requested for: 28Mar2017 Ordered   9  OxyCODONE HCl - 5 MG Oral Tablet; Take 1 tablet every 6 hours as needed for pain; Therapy: 35PXZ8964 to (Evaluate:79Mqu2567); Last Rx:28Tik2269 Ordered   10  Prochlorperazine Maleate 10 MG Oral Tablet; TAKE 1 TABLET EVERY 6 HOURS AS    NEEDED FOR NAUSEA; Therapy: 23CAC2274 to (77 873 135)  Requested for: 22Aug2017; Last    Rx:72Trt6342 Ordered  Medication List Reviewed: The medication list was reviewed and updated today  Allergies  1  No Known Drug Allergies    Vitals  Vital Signs    Recorded: 31Aug2017 10:34AM   Temperature 98 4 F   Heart Rate 84   Respiration 16   Systolic 995   Diastolic 68   Height 5 ft 10 in   Weight 157 lb    BMI Calculated 22 53   BSA Calculated 1 89   O2 Saturation 99   Pain Scale 4     Physical Exam    Constitutional   General appearance: No acute distress, well appearing and well nourished  -- facial muscle wasting noted  Eyes   Conjunctiva and lids: No swelling, erythema, or discharge  -- no jaundice  Ears, Nose, Mouth, and Throat   External inspection of ears and nose: Normal     Nasal mucosa, septum, and turbinates: Normal without edema or erythema  Oropharynx: Normal with no erythema, edema, exudate or lesions  -- no visible thrush  Pulmonary   Respiratory effort: No increased work of breathing or signs of respiratory distress  Auscultation of lungs: Clear to auscultation, equal breath sounds bilaterally, no wheezes, no rales, no rhonci  Cardiovascular   Auscultation of heart: Normal rate and rhythm, normal S1 and S2, without murmurs  Examination of extremities for edema and/or varicosities: Normal     Abdomen ascites noted  Musculoskeletal   Gait and station: Normal     Digits and nails: Normal without clubbing or cyanosis      Inspection/palpation of joints, bones, and muscles: Normal     Skin   Skin and subcutaneous tissue: Abnormal  -- bilateral LE edema - severe  Neurologic   Cranial nerves: Cranial nerves 2-12 intact  Psychiatric   Orientation to person, place and time: Normal     Mood and affect: Normal           KPS Score and PPS       Total Percentage of Normal Performance Status 90  Future Appointments    Date/Time Provider Specialty Site   10/02/2017 11:00 AM AIDEN Shannon  Palliative Care [facility]   09/05/2017 01:30 PM Wayne Oneal, [facility]     Signatures   Electronically signed by :  AIDEN Harris ; Aug 31 2017  4:35PM EST                       (Author)

## 2018-01-09 NOTE — MISCELLANEOUS
Message  spoke again with daughter Carolina Israel  she is concerned about her father who seems to be "declining"  she is inquiring about home health aides, SNF and hospice  her father is not ready to stop treatment  patient has VNA coming to home  I suggested she consult with VNA nurse regarding social work consult to explore continuing needs  patient is receiving physical therapy in the home as well  daughter will be at f/u appt in Sept to discuss response to treatment and prognosis      Active Problems    1  Abdominal pain, RUQ (789 01) (R10 11)   2  Abnormal weight loss (783 21) (R63 4)   3  Ascites (789 59) (R18 8)   4  Bloating (787 3) (R14 0)   5  Cancer related pain (338 3) (G89 3)   6  Difficulty sleeping (780 50) (G47 9)   7  Extensor tendon laceration of finger with open wound, subsequent encounter (V58 89)   (S66 529D,S61 209D)   8  Family history of colon cancer (V16 0) (Z80 0)   9  Fatigue (780 79) (R53 83)   10  [place] disease (277 4) (E80 4)   11  Hepatitis C (070 70) (B19 20)   12  Hepatocellular carcinoma (155 0) (C22 0)   13  History of colon polyps (V12 72) (Z86 010)   14  Liver cirrhosis (571 5) (K74 60)   15  Liver disease (573 9) (K76 9)   16  Lung nodule (793 11) (R91 1)   17  Malignant neoplasm metastatic to lung, unspecified laterality (197 0) (C78 00)   18  Oral candidiasis (112 0) (B37 0)   19  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)   20  Spleen laceration (865 09) (S36 039A)   21  Thrombocytopenia (287 5) (D69 6)    Current Meds   1  DiazePAM 2 MG Oral Tablet; TAKE 1 TABLET AT BEDTIME; Therapy: 38PVI9870 to (Evaluate:49Dpj5208); Last Rx:77Wka8808 Ordered   2  FentaNYL 25 MCG/HR Transdermal Patch 72 Hour; APPLY 1 PATCH EVERY 3 DAYS; Therapy: 99QUZ4247 to (Evaluate:19Rhx5031); Last Rx:57Vzo9577 Ordered   3  Fluconazole 100 MG Oral Tablet; TAKE 2 TABLETS ON DAY 1, THEN 1 TABLET DAILY   until gone;    Therapy: 82OPW3907 to (Evaluate:10Aug2017)  Requested for: 22PHP8758; Last   Rx:31Jul2017 Ordered   4  Furosemide 40 MG Oral Tablet; TAKE 1 TABLET DAILY AS DIRECTED; Therapy: 53BMR5551 to (Evaluate:09Aug2017)  Requested for: 11Apr2017; Last   Rx:11Apr2017 Ordered   5  Lidocaine 5 % External Ointment; APPLY TO PAINFUL AREAS on left later rib cage 2 TO   3 TIMES DAILY AS NEEDED; Therapy: 00NQV6937 to (Evaluate:28Feb2017); Last Rx:30Nov2016 Ordered   6  Methylphenidate HCl - 5 MG Oral Tablet; 1 tablet daily as needed for fatigue  Do not take   after 1pm;   Therapy: 39LWK9775 to (Evaluate:30Aug2017); Last Rx:31Jul2017 Ordered   7  Multi-Vitamin Oral Tablet Recorded   8  NexAVAR 200 MG Oral Tablet; 2 tabs in am , one tab in pm;   Therapy: 55HMV8545 to (Last Rx:28Mar2017)  Requested for: 28Mar2017 Ordered   9  OxyCODONE HCl - 5 MG Oral Tablet; Take 1 tablet every 6 hours as needed for pain; Therapy: 00WDN2317 to (Evaluate:30Aug2017); Last Rx:31Jul2017 Ordered   10  Prochlorperazine Maleate 10 MG Oral Tablet; TAKE 1 TABLET EVERY 6 HOURS AS    NEEDED FOR NAUSEA; Therapy: 84SUY2311 to (96 742755)  Requested for: 22Aug2017; Last    Rx:22Aug2017 Ordered    Allergies    1   No Known Drug Allergies    Signatures   Electronically signed by : Mini Montano, ; Aug 25 2017  3:18PM EST                       (Author)

## 2018-01-11 NOTE — PROCEDURES
Procedures by Shilpi Skinner PA-C  at 2/16/2017 12:10 PM      Author:  Shilpi Skinnre PA-C  Service:  Interventional Radiology   Author Type:  Physician Assistant     Filed:  2/16/2017 12:12 PM  Date of Service:  2/16/2017 12:10 PM  Status:  Attested Addendum     :  Shilpi Skinner PA-C (Physician Assistant)         Related Notes: Original Note by Shilpi Skinner PA-C (Physician Assistant) filed at 2/16/2017 12:11 PM        Cosigner:  Luciano Cates MD at 2/16/2017 12:37 PM           Procedure Orders:       1  Paracentesis [[id number]] ordered by Shilpi Skinner PA-C at 02/16/17 1210               Attestation signed by Luciano Cates MD at 2/16/2017 12:37 PM           I attest that I supervised this procedure and was available for its duration      Kelsey Emmanuel MD                                              Paracentesis  Performed by: Yousuf Garcia  Authorized by: Naif Clinton     Patient location:  Other (comment) (Radiology / Interventional Radiology)   Other Assisting Provider: No    Consent:     Consent obtained:  Verbal and written    Consent given by:  Patient    Risks discussed:  Bleeding, bowel perforation, infection and pain    Alternatives discussed:  No treatment, delayed treatment, alternative treatment and observation  Universal protocol:     Procedure explained and questions answered to patient or proxy's satisfaction: yes      Relevant documents present and verified: yes      Test results available and properly labeled: yes       Imaging studies available: yes      Required blood products, implants, devices and special equipment available: yes      Site/side marked: yes      Immediately prior to procedure a time out was called: yes      Patient identity confirmed:  Verbally with patient and arm band  Pre-procedure details:     Initial or Subsequent Exam:  Subsequent    Procedure purpose:  Therapeutic    Indications: abdominal discomfort secondary to ascites      Preparation: Patient was prepped and draped in usual sterile fashion    Anesthesia (see MAR for exact dosages): Anesthesia method:  Local infiltration    Local anesthetic:  Lidocaine 1% w/o epi  Procedure details:     Needle gauge: 5 Fr  Nellieeh  Equipment: Paracentesis kit used      Ultrasound guidance: yes      Approach:  Percutaneous    Puncture site:  R lower quadrant    Fluid removed amount:  2,650    Fluid appearance:  Yellow and clear    Dressing:  Adhesive bandage  Post-procedure details:     Patient tolerance of procedure: Tolerated well, no immediate complications  Post-procedure medication (see MAR for dosing): Albumin replacement: No    Comments:      Ascites fluid was sent to the lab for further analysis  The procedure was performed by Mirella Stahl PA-C under the direct supervision of Dr Drake Arriaga                      Received for:Katerina Whittington [facility]  Feb 16 2017 12:37PM [facility] Standard Time

## 2018-01-11 NOTE — MISCELLANEOUS
Message  1/25/17 total bilirubin 3 4, per Dr Gelacio Malik patient called to check status,-patient has occasional fatigue, mild nausea after doing an activity, lies down, takes a nap and then feels better  Occasional right abdominal "discomfort, tenderness", some "tightness" below umbilical area; feels a little bloated  Denies jaundice of sclera, dark urine or light colored stools  Informed T  Bilirubin is elevated and Dr Gelacio Malik wants him seen by Dr Renee Garcia  Patient instructed to call Dr Carter Morris office tomorrow AM if he doesn't hear from them  Instructed to go to ED if symptoms worsen or becomes jaundiced, or has change in urine/stool color  Dr Carter Morris office called, message left informing them Dr Gelacio Malik would like patient seen tomorrow  BE      Active Problems     1  Abdominal pain, RUQ (789 01) (R10 11)   2  Difficulty sleeping (780 50) (G47 9)   3  Extensor tendon laceration of finger with open wound, subsequent encounter (V58 89)   (S66 529D,S61 209D)   4  Family history of colon cancer (V16 0) (Z80 0)   5  History of colon polyps (V12 72) (Z86 010)   6  Lung nodule (793 11) (R91 1)   7  Spleen laceration (865 09) (S36 039A)   8  Thrombocytopenia (287 5) (D69 6)    [place] disease (277 4) (E80 4)       Hepatocellular carcinoma (155 0) (C22 0)       Hepatitis C (070 70) (B19 20)       Malignant neoplasm metastatic to lung, unspecified laterality (197 0) (C78 00)       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)       Cancer related pain (338 3) (G89 3)       Fatigue (780 79) (R53 83)          Current Meds   1  Lidocaine 5 % External Ointment; APPLY TO PAINFUL AREAS on left later rib cage 2 TO   3 TIMES DAILY AS NEEDED; Therapy: 15QPC7232 to (Evaluate:48Ldz0335); Last Rx:30Nov2016 Ordered   2  Methylphenidate HCl - 5 MG Oral Tablet; 1-2 tablet daily as needed for fatigue  Do not   take after 1pm;   Therapy: 81XOM1912 to (Evaluate:61Obu3354); Last Rx:25Jan2017 Ordered   3  Multi-Vitamin Oral Tablet Recorded   4   Nortriptyline HCl - 25 MG Oral Capsule; 1 CAPSULE AT BEDTIME; Therapy: 13VOC7998 to (Evaluate:49Rwf5315)  Requested for: 85XSI5301; Last   Rx:30Nov2016 Ordered   5  OxyCODONE HCl - 5 MG Oral Tablet; Take 1 tablet every 6 hours as needed for pain; Therapy: 87NDK6967 to (Evaluate:44Vnj1578); Last Rx:25Jan2017 Ordered    Allergies    1   No Known Drug Allergies    Signatures   Electronically signed by : AIDEN Lance ; Jul 18 2017 10:25AM EST                       (Author)

## 2018-01-12 VITALS
BODY MASS INDEX: 23.15 KG/M2 | TEMPERATURE: 98.1 F | DIASTOLIC BLOOD PRESSURE: 70 MMHG | HEART RATE: 86 BPM | RESPIRATION RATE: 16 BRPM | HEIGHT: 71 IN | WEIGHT: 165.38 LBS | OXYGEN SATURATION: 94 % | SYSTOLIC BLOOD PRESSURE: 126 MMHG

## 2018-01-12 VITALS
HEART RATE: 78 BPM | BODY MASS INDEX: 23.38 KG/M2 | TEMPERATURE: 98.4 F | OXYGEN SATURATION: 99 % | RESPIRATION RATE: 16 BRPM | OXYGEN SATURATION: 97 % | SYSTOLIC BLOOD PRESSURE: 118 MMHG | TEMPERATURE: 99 F | WEIGHT: 163.31 LBS | WEIGHT: 135 LBS | BODY MASS INDEX: 19.33 KG/M2 | SYSTOLIC BLOOD PRESSURE: 130 MMHG | DIASTOLIC BLOOD PRESSURE: 68 MMHG | RESPIRATION RATE: 18 BRPM | HEIGHT: 70 IN | HEART RATE: 80 BPM | HEIGHT: 70 IN | DIASTOLIC BLOOD PRESSURE: 78 MMHG

## 2018-01-12 VITALS
TEMPERATURE: 98 F | WEIGHT: 174 LBS | RESPIRATION RATE: 17 BRPM | HEIGHT: 71 IN | OXYGEN SATURATION: 98 % | DIASTOLIC BLOOD PRESSURE: 62 MMHG | BODY MASS INDEX: 24.36 KG/M2 | SYSTOLIC BLOOD PRESSURE: 104 MMHG | HEART RATE: 84 BPM

## 2018-01-12 VITALS
HEART RATE: 98 BPM | BODY MASS INDEX: 19.08 KG/M2 | RESPIRATION RATE: 16 BRPM | DIASTOLIC BLOOD PRESSURE: 84 MMHG | HEIGHT: 70 IN | TEMPERATURE: 98 F | OXYGEN SATURATION: 98 % | WEIGHT: 133.25 LBS | SYSTOLIC BLOOD PRESSURE: 126 MMHG

## 2018-01-12 NOTE — RESULT NOTES
Message   I called patient, spoke to him and discussed the results of his blood work  Please have the patient follow up as needed  Verified Results  (1) ALBUMIN, FLUID [id number] 11:43AM Scott Lopez     Test Name Result Flag Reference   ALBUMIN FLUID 0 6 g/dL     The reference range and other method performance specifications have not been established for this body fluid  The test result must be integrated into the clinical context for interpretation  (1) WBC AND DIFF,FLUID [id number] 11:43AM Susi JohnsonmaddyScott     Test Name Result Flag Reference   WBC FLUID 114 /ul     SITE Paracentesis       (1) WBC AND DIFF,FLUID [id number] 11:43AM Scott Lopez     Test Name Result Flag Reference   NEUTROPHIL, FLUID 6 %     LYMPHS FLUID 11 %     MONOCYTES FLUID 68 %     BASO FLUID 1 %     MESOTHELIAL FLUID 8 %     HISTIOCYTE, FLUID 6 %     TOTAL COUNTED 100       (1) NON-GYNECOLOGIC CYTOLOGY [id number] 11:43AM [facility]     Test Name Result Flag Reference   LAB AP CASE REPORT (Report)     Non-gynecologic Cytology             Case: [id number]                  Authorizing Provider: Radha Le MD      Collected:      02/16/2017 1143        Ordering Location:   [place] [address]   Received:      02/16/2017 [place] [address]                                        Radiology                                   Pathologist:      Vinh Fisher MD                              Specimen:  Ascites   LAB AP CYTO FINAL DIAGNOSIS      A  Ascites:  Negative for malignancy  Unremarkable mesothelial cells  Few lymphocytes and neutrophils  Satisfactory for evaluation  Electronically signed by Vinh Fisher MD on 2/22/2017 at 10:39 AM   LAB AP GROSS DESCRIPTION      A  Ascites, : 55ml   Yellow, clear, received in [facility]   LAB AP CLINICAL INFORMATION ascities     LAB AP NONGYN ADDITIONAL INFORMATION (Report)     [email or web address]'s FDA approved ,  and ThinPrep Imaging System are   utilized with strict adherence to the 's instruction manual to   prepare gynecologic and non-gynecologic cytology specimens for the   production of ThinPrep slides as well as for gynecologic ThinPrep imaging  These processes have been validated by our laboratory and/or by the     These tests were developed and their performance characteristics   determined by [facility] or [place]  They may not be cleared or approved by the [place]  Food and   Drug Administration  The FDA has determined that such clearance or   approval is not necessary  These tests are used for clinical purposes  They should not be regarded as investigational or for research  This   laboratory has been approved by [place] [address], designated as a high-complexity   laboratory and is qualified to perform these tests  - Interpretation performed at [facility], [address] [place] [address] 18     IR PARACENTESIS [id number] 10:32AM Eppie Klinefelter Order Number: [id number]   Performing Comments: check fluid:   cell count w  diff   albumin   cytology   give albumin 25 grams with 4L   - Patient Instructions: To schedule this appointment, please contact Central Scheduling at [phone]  For [employer] Staff, please call [phone]  Test Name Result Flag Reference   IR PARACENTESIS (Report)     Therapeutic Ultrasound-Guided Paracentesis     Indication:Symptomatic ascites secondary to hepatocellular carcinoma with underlying decompensated hepatitis C cirrhosis  Procedure:      After explaining the risks and benefits of the procedure to the patient, informed consent was obtained  During the procedure, the patient was in the supine position on their stretcher in the Interventional Radiology holding area  A suitable location for puncture was identified using ultrasound guidance and localized to the right lower quadrant abdomen  A timeout was verbalized        The overlying skin was prepped and draped in usual sterile fashion and local anesthesia was obtained using the 1% lidocaine solution  A 5-[place] Empiriboxeh multiple side-hole catheter was advanced until ascites fluid was aspirated  A static sonographic image   was saved  Fluid was drained by vacuum bottle  Following drainage, the puncture site was cleansed and a dressing was applied  The patient tolerated the procedure well, experienced no immediate complications and left the department in stable    condition  Findings:     Focused ultrasound of the abdomen confirms the presence of ascites  Approximately 2,650 mL of clear yellow ascites fluid was removed  Fluid was sent to the lab for further analysis  IMPRESSION:   Impression: Successful therapeutic ultrasound-guided paracentesis yielding 2,650 ml of clear yellow ascites fluid  Fluid was sent to the lab for further analysis  I reviewed the above findings and procedure with Dr Yimi Pereira  Procedure was performed by Miguelito Julien PA-C under the direct supervision of Dr Yimi Pereira  PERFORMED, DICTATED AND SIGNED BY: Orma Pulse       Workstation performed: [id number]     Signed by:   Robert Kulkarni MD   2/16/17     (1) HEPATIC FUNCTION PANEL [id number] 04:42PM [facility] Order Number: [id number]     Test Name Result Flag Reference   ALBUMIN 2 7 g/dL L 3 5-5 0   - Patient Instructions: This is a non fasting blood test  Please drink two glasses of water the morning of test     - Patient Instructions:  This is a non fasting blood test  Please drink two glasses of water the morning of test    ALK PHOSPHATAS 196 U/L H    ALT (SGPT) 95 U/L H 12-78   AST(SGOT) 176 U/L H 5-45   BILI, DIRECT 1 66 mg/dL H 0 00-0 20   BILI, TOTAL 2 50 mg/dL H 0 20-1 00   TOTAL PROTEIN 8 0 g/dL  6 4-8 2     (1) AFP, SERUM [id number] 04:42PM [facility] Order Number: [id number]     Test Name Result Flag Reference    7 ng/mL H 0 0 - 8 3   Normal values apply only to males and to nonpregnant females  These results are not interpretable for pregnant females  Roche ECLIA methodology  Values obtained with different assay methods or kits cannot be  used interchangeably  Results cannot be interpreted as absolute  evidence of the presence or absence of malignant disease  Performed at:  [address] [place] [address]  [id number]  : Kishore Mcguire MD, Phone:  [phone]     (1) CBC/ PLT (NO DIFF) [id number] 04:42PM Toan Rutledge Order Number: [id number]     Test Name Result Flag Reference   HEMATOCRIT 38 1 %  36 5-49 3   HEMOGLOBIN 12 5 g/dL  12 0-17 0   MCHC 32 8 g/dL  31 4-37 4   MCH 32 4 pg  26 8-34 3   MCV 99 fL H 82-98   PLATELET COUNT 72 Thousands/uL L 149-390   RBC COUNT 3 86 Million/uL L 3 88-5 62   RDW 13 5 %  11 6-15 1   WBC COUNT 5 41 Thousand/uL  4 31-10 16   MPV 11 0 fL  8 9-12 7     (1) PT WITH INR [id number] 04:42PM Flavia Polk    Order Number: [id number]     Test Name Result Flag Reference   INR 1 88 H 0 86-1 16   PT 21 0 seconds H 12 0-14 3     (1) BASIC METABOLIC PROFILE [id number] 04:42PM Toan Rutledge Order Number: [id number]     Test Name Result Flag Reference   GLUCOSE,RANDM 84 mg/dL     If the patient is fasting, the ADA then defines impaired fasting glucose as > 100 mg/dL and diabetes as > or equal to 123 mg/dL  SODIUM 141 mmol/L  136-145   POTASSIUM 3 4 mmol/L L 3 5-5 3   CHLORIDE 105 mmol/L  100-108   CARBON DIOXIDE 32 mmol/L  21-32   ANION GAP (CALC) 4 mmol/L  4-13   BLOOD UREA NITROGEN 8 mg/dL  5-25   CREATININE 1 08 mg/dL  0 60-1 30   Standardized to IDMS reference method   CALCIUM 8 2 mg/dL L 8 3-10 1   eGFR Non-African American      >60 0 ml/min/1 73sq m   - Patient Instructions:  This is a non fasting blood test  Please drink two glasses of water the morning of test

## 2018-01-13 VITALS
TEMPERATURE: 98.3 F | BODY MASS INDEX: 22.48 KG/M2 | SYSTOLIC BLOOD PRESSURE: 122 MMHG | HEART RATE: 78 BPM | WEIGHT: 157 LBS | DIASTOLIC BLOOD PRESSURE: 80 MMHG | RESPIRATION RATE: 16 BRPM | OXYGEN SATURATION: 98 % | HEIGHT: 70 IN

## 2018-01-13 VITALS
RESPIRATION RATE: 16 BRPM | HEART RATE: 74 BPM | OXYGEN SATURATION: 98 % | HEIGHT: 70 IN | WEIGHT: 153.31 LBS | TEMPERATURE: 97.8 F | BODY MASS INDEX: 21.95 KG/M2 | SYSTOLIC BLOOD PRESSURE: 112 MMHG | DIASTOLIC BLOOD PRESSURE: 72 MMHG

## 2018-01-13 VITALS
HEART RATE: 97 BPM | HEIGHT: 71 IN | OXYGEN SATURATION: 93 % | DIASTOLIC BLOOD PRESSURE: 70 MMHG | WEIGHT: 165 LBS | TEMPERATURE: 98.9 F | SYSTOLIC BLOOD PRESSURE: 120 MMHG | BODY MASS INDEX: 23.1 KG/M2 | RESPIRATION RATE: 16 BRPM

## 2018-01-13 VITALS
OXYGEN SATURATION: 99 % | TEMPERATURE: 98.4 F | SYSTOLIC BLOOD PRESSURE: 110 MMHG | HEIGHT: 70 IN | DIASTOLIC BLOOD PRESSURE: 68 MMHG | BODY MASS INDEX: 22.48 KG/M2 | HEART RATE: 84 BPM | RESPIRATION RATE: 16 BRPM | WEIGHT: 157 LBS

## 2018-01-13 VITALS
OXYGEN SATURATION: 97 % | SYSTOLIC BLOOD PRESSURE: 110 MMHG | WEIGHT: 157 LBS | TEMPERATURE: 98 F | HEART RATE: 80 BPM | RESPIRATION RATE: 16 BRPM | DIASTOLIC BLOOD PRESSURE: 72 MMHG | HEIGHT: 70 IN | BODY MASS INDEX: 22.48 KG/M2

## 2018-01-13 VITALS
HEIGHT: 71 IN | TEMPERATURE: 98.2 F | SYSTOLIC BLOOD PRESSURE: 120 MMHG | HEART RATE: 77 BPM | WEIGHT: 164 LBS | BODY MASS INDEX: 22.96 KG/M2 | OXYGEN SATURATION: 96 % | RESPIRATION RATE: 17 BRPM | DIASTOLIC BLOOD PRESSURE: 80 MMHG

## 2018-01-13 NOTE — MISCELLANEOUS
Message   Recorded as Task   Date: 04/11/2017 10:04 AM, Created By: Gabino Caballero   Task Name: Care Coordination   Assigned To: Yaw Huber   Regarding Patient: Darion Beasley, Status: Active   CommentDebbie Martinez - 11 Apr 2017 10:04 AM     TASK CREATED  Patient has been approved for a kindra through the [facility] program  His co-payment will be $5 00 for his Nexavar  [address] will be dispensing  They will be reaching out to the patient for scheduling shipment  Chitra Paulino - 11 Apr 2017 10:13 AM     TASK EDITED        Active Problems    1  Abdominal pain, RUQ (789 01) (R10 11)   2  Ascites (789 59) (R18 8)   3  Bloating (787 3) (R14 0)   4  Cancer related pain (338 3) (G89 3)   5  Difficulty sleeping (780 50) (G47 9)   6  Extensor tendon laceration of finger with open wound, subsequent encounter (V58 89)   (S66 529D,S61 209D)   7  Family history of colon cancer (V16 0) (Z80 0)   8  Fatigue (780 79) (R53 83)   9  [place] disease (277 4) (E80 4)   10  Hepatitis C (070 70) (B19 20)   11  Hepatocellular carcinoma (155 0) (C22 0)   12  History of colon polyps (V12 72) (Z86 010)   13  Liver cirrhosis (571 5) (K74 60)   14  Liver disease (573 9) (K76 9)   15  Lung nodule (793 11) (R91 1)   16  Malignant neoplasm metastatic to lung, unspecified laterality (197 0) (C78 00)   17  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)   18  Spleen laceration (865 09) (S36 039A)   19  Thrombocytopenia (287 5) (D69 6)    Current Meds   1  FentaNYL 12 MCG/HR Transdermal Patch 72 Hour; APPLY 1 PATCH EVERY 3 DAYS; Therapy: 17OOG4608 to (Evaluate:16Lsd8499); Last Rx:22Cal5743 Ordered   2  Furosemide 40 MG Oral Tablet; TAKE 1 TABLET DAILY AS DIRECTED; Therapy: 57FAV0162 to (Evaluate:15Jun2017)  Requested for: 87FFR7011; Last   Rx:50Mke2286 Ordered   3  Lidocaine 5 % External Ointment; APPLY TO PAINFUL AREAS on left later rib cage 2 TO   3 TIMES DAILY AS NEEDED; Therapy: 39RKQ4408 to (Evaluate:82Eis0265);  Last Rx:30Nov2016 Ordered 4  Methylphenidate HCl - 10 MG Oral Tablet; 1 tablet daily as needed for fatigue; Therapy: 19MIM2149 to (Evaluate:06May2017); Last Rx:06Apr2017 Ordered   5  Multi-Vitamin Oral Tablet Recorded   6  NexAVAR 200 MG Oral Tablet; 2 tabs in am , one tab in pm;   Therapy: 14FRZ8778 to (Last Rx:28Mar2017)  Requested for: 28Mar2017 Ordered   7  OxyCODONE HCl - 5 MG Oral Tablet; Take 1 tablet every 6 hours as needed for pain; Therapy: 92VBA8528 to (Evaluate:06May2017); Last Rx:06Apr2017 Ordered   8  Spironolactone 50 MG Oral Tablet; TAKE 2 TABLETS DAILY; Therapy: 10HMQ4416 to (Evaluate:15Jun2017)  Requested for: 75FZK7062; Last   Rx:58Oia0874 Ordered    Allergies    1   No Known Drug Allergies    Signatures   Electronically signed by : Russ Parsons, ; Apr 11 2017 10:13AM EST                       (Author)

## 2018-01-13 NOTE — MISCELLANEOUS
Message   Recorded as Task   Date: 05/10/2017 12:00 PM, Created By: Azam Kumar   Task Name: Medical Complaint Callback   Assigned To: Yaw Huber   Regarding Patient: Darion Beasley, Status: In Progress   Comment:    Fanta Elliott - 10 May 2017 12:00 PM     TASK CREATED  Caller: Self; Medical Complaint; [phone] (Home)  PT WOULD LIKE TO SPEAK TO YOU AS YOU ASKED HIM TO CALL YOU IF HE HAD ANY PROBLEMS  FOR THE PAST HOUR AND 1/2 HE IS C/O DIRREAHEA, VOMITING AND NEUSEA  WHAT CAN HE DO FOR IT?  Givens Susy - 10 May 2017 2:14 PM     TASK EDITED  Please call patient to get more info  Jefferson,Chitra - 10 May 2017 2:26 PM     TASK EDITED   Yaw Huber - 10 May 2017 2:26 PM     TASK IN PROGRESS   spoke with patient , he woke up with 5 hours of nausea and dry heaves  prochlorperazine sent to pharmacy for patient to take upon arising  patient verbalizes understanding1        1 Amended By: Yaw Huber; May 10 2017 2:54 PM EST    Active Problems   1  Abdominal pain, RUQ (789 01) (R10 11)  2  Ascites (789 59) (R18 8)  3  Bloating (787 3) (R14 0)  4  Cancer related pain (338 3) (G89 3)  5  Difficulty sleeping (780 50) (G47 9)  6  Extensor tendon laceration of finger with open wound, subsequent encounter (V58 89)   (S66 529D,S61 209D)  7  Family history of colon cancer (V16 0) (Z80 0)  8  Fatigue (780 79) (R53 83)  9  [place] disease (277 4) (E80 4)  10  Hepatitis C (070 70) (B19 20)  11  Hepatocellular carcinoma (155 0) (C22 0)  12  History of colon polyps (V12 72) (Z86 010)  13  Liver cirrhosis (571 5) (K74 60)  14  Liver disease (573 9) (K76 9)  15  Lung nodule (793 11) (R91 1)  16  Malignant neoplasm metastatic to lung, unspecified laterality (197 0) (C78 00)  17  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)  18  Spleen laceration (865 09) (S36 039A)  19  Thrombocytopenia (287 5) (D69 6)    Current Meds  1  FentaNYL 12 MCG/HR Transdermal Patch 72 Hour; APPLY 1 PATCH EVERY 3 DAYS;    Therapy: 97IWJ7353 to (Evaluate:03Jun2017); Last QW:41MLN7219 Ordered  2  Furosemide 40 MG Oral Tablet; TAKE 1 TABLET DAILY AS DIRECTED; Therapy: 99HHF8212 to (Evaluate:09Aug2017)  Requested for: 11Apr2017; Last   Rx:11Apr2017 Ordered  3  Lidocaine 5 % External Ointment; APPLY TO PAINFUL AREAS on left later rib cage 2 TO   3 TIMES DAILY AS NEEDED; Therapy: 38PTU6683 to (Evaluate:28Feb2017); Last Rx:30Nov2016 Ordered  4  Methylphenidate HCl - 10 MG Oral Tablet; 1 tablet daily as needed for fatigue; Therapy: 24IDS6968 to (Evaluate:03Jun2017); Last NE:98AMQ4639 Ordered  5  Multi-Vitamin Oral Tablet Recorded  6  NexAVAR 200 MG Oral Tablet; 2 tabs in am , one tab in pm;   Therapy: 12GLH9165 to (Last Rx:28Mar2017)  Requested for: 28Mar2017 Ordered  7  OxyCODONE HCl - 5 MG Oral Tablet; Take 1 tablet every 6 hours as needed for pain; Therapy: 42RBD9757 to (Evaluate:03Jun2017); Last XJ:18AMX1417 Ordered  8  Spironolactone 50 MG Oral Tablet; TAKE 2 TABLETS DAILY; Therapy: 66SOG4244 to (Evaluate:09Aug2017)  Requested for: 11Apr2017; Last   Rx:11Apr2017 Ordered    Allergies   1   No Known Drug Allergies    Plan  Hepatocellular carcinoma    · Start: Prochlorperazine Maleate 10 MG Oral Tablet; TAKE 1 TABLET EVERY 6 HOURS  AS NEEDED FOR NAUSEA    Signatures   Electronically signed by : Riley Jean, ; May 10 2017  2:55PM EST                       (Author)

## 2018-01-14 VITALS
RESPIRATION RATE: 16 BRPM | HEIGHT: 70 IN | DIASTOLIC BLOOD PRESSURE: 72 MMHG | WEIGHT: 155 LBS | SYSTOLIC BLOOD PRESSURE: 112 MMHG | TEMPERATURE: 97.9 F | BODY MASS INDEX: 22.19 KG/M2 | OXYGEN SATURATION: 96 % | HEART RATE: 79 BPM

## 2018-01-14 VITALS
TEMPERATURE: 97.8 F | DIASTOLIC BLOOD PRESSURE: 78 MMHG | RESPIRATION RATE: 16 BRPM | WEIGHT: 161 LBS | BODY MASS INDEX: 22.54 KG/M2 | OXYGEN SATURATION: 97 % | HEART RATE: 96 BPM | HEIGHT: 71 IN | SYSTOLIC BLOOD PRESSURE: 118 MMHG

## 2018-01-14 VITALS
HEART RATE: 79 BPM | TEMPERATURE: 97.6 F | DIASTOLIC BLOOD PRESSURE: 76 MMHG | BODY MASS INDEX: 23.34 KG/M2 | HEIGHT: 70 IN | OXYGEN SATURATION: 96 % | SYSTOLIC BLOOD PRESSURE: 128 MMHG | WEIGHT: 163.03 LBS

## 2018-01-14 VITALS
RESPIRATION RATE: 16 BRPM | WEIGHT: 154.03 LBS | OXYGEN SATURATION: 98 % | SYSTOLIC BLOOD PRESSURE: 122 MMHG | HEART RATE: 73 BPM | BODY MASS INDEX: 22.05 KG/M2 | DIASTOLIC BLOOD PRESSURE: 80 MMHG | TEMPERATURE: 97.8 F | HEIGHT: 70 IN

## 2018-01-14 VITALS
RESPIRATION RATE: 17 BRPM | HEIGHT: 71 IN | HEART RATE: 84 BPM | DIASTOLIC BLOOD PRESSURE: 72 MMHG | WEIGHT: 169.31 LBS | TEMPERATURE: 99.3 F | OXYGEN SATURATION: 97 % | BODY MASS INDEX: 23.7 KG/M2 | SYSTOLIC BLOOD PRESSURE: 122 MMHG

## 2018-01-14 NOTE — PROGRESS NOTES
Discussion/Summary  Social Work-Discussion Summary [facility]: Patient is being seen for an initial assessment   Per referral from med onc, pt is transitioning to hospice care  Pt's daughter's expressed concerns about providing him hospice care in their homes  LSW met with pt and his two adult daughters  LSW answered questions about hospice care  Emotional support and LSW's contact informant were provided to pt's daughters  According to Dr Andres Christianson, pt was transferred to inpatient hospice care at the hospice house on 9/5/2017  LSW will remain available to provide assistance        Signatures   Electronically signed by : SHORTY Yen; Sep  5 2017  3:36PM EST                       (Author)

## 2018-01-14 NOTE — MISCELLANEOUS
Message   Recorded as Task   Date: 08/30/2017 12:28 PM, Created By: Kyara Choudhury   Task Name: Care Coordination   Assigned To: Kyara Choudhury   Regarding Patient: Reagan Edwards, Status: Active   Comment:    Chitra Paulino - 30 Aug 2017 12:28 PM     TASK CREATED  please schedule patient for therapeutic  paracentesis  Bandar Cruz patient has AM appt with Dr Rosy Vasquez tomorrow   could we arrange at [facility] after that appt/  patient expecting your call    [place]! Fannie Farah - 30 Aug 2017 12:53 PM     TASK REPLIED TO: Previously Assigned To [employer] only had an appt in [place] for tomorrow  Pt was ok with going to [place] tomorrow after his appt with Dr Rosy Vasquez  Appt is for 1:15pm    Chitra Paulino - 30 Aug 2017 1:08 PM     TASK EDITED  [facility] nurse had called to inform office that patient's abd circumference had increased by 2 inches aith increased pain  paracentesis set up for tomorrow        Active Problems    1  Abdominal pain, RUQ (789 01) (R10 11)   2  Abnormal weight loss (783 21) (R63 4)   3  Ascites (789 59) (R18 8)   4  Bloating (787 3) (R14 0)   5  Cancer related pain (338 3) (G89 3)   6  Difficulty sleeping (780 50) (G47 9)   7  Extensor tendon laceration of finger with open wound, subsequent encounter (V58 89)   (S66 529D,S61 209D)   8  Family history of colon cancer (V16 0) (Z80 0)   9  Fatigue (780 79) (R53 83)   10  [place] disease (277 4) (E80 4)   11  Hepatitis C (070 70) (B19 20)   12  Hepatocellular carcinoma (155 0) (C22 0)   13  History of colon polyps (V12 72) (Z86 010)   14  Liver cirrhosis (571 5) (K74 60)   15  Liver disease (573 9) (K76 9)   16  Lung nodule (793 11) (R91 1)   17  Malignant neoplasm metastatic to lung, unspecified laterality (197 0) (C78 00)   18  Oral candidiasis (112 0) (B37 0)   19  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)   20  Spleen laceration (865 09) (S36 039A)   21  Thrombocytopenia (287 5) (D69 6)    Current Meds   1   DiazePAM 2 MG Oral Tablet; TAKE 1 TABLET AT BEDTIME; Therapy: 10GBU8866 to (Evaluate:30Aug2017); Last Rx:24Dnw5391 Ordered   2  FentaNYL 25 MCG/HR Transdermal Patch 72 Hour; APPLY 1 PATCH EVERY 3 DAYS; Therapy: 94UDS5528 to (Evaluate:30Aug2017); Last Rx:64Tks7972 Ordered   3  Fluconazole 100 MG Oral Tablet; TAKE 2 TABLETS ON DAY 1, THEN 1 TABLET DAILY   until gone; Therapy: 86HRQ1968 to (Evaluate:10Aug2017)  Requested for: 51NQJ9729; Last   Rx:43Lzz7715 Ordered   4  Furosemide 40 MG Oral Tablet; TAKE 1 TABLET DAILY AS DIRECTED; Therapy: 96BNI9847 to (Evaluate:09Aug2017)  Requested for: 11Apr2017; Last   Rx:11Apr2017 Ordered   5  Lidocaine 5 % External Ointment; APPLY TO PAINFUL AREAS on left later rib cage 2 TO   3 TIMES DAILY AS NEEDED; Therapy: 77RSK1932 to (Evaluate:28Feb2017); Last Rx:30Nov2016 Ordered   6  Methylphenidate HCl - 5 MG Oral Tablet; 1 tablet daily as needed for fatigue  Do not take   after 1pm;   Therapy: 86YFC1223 to (Evaluate:30Aug2017); Last Rx:38Jth2168 Ordered   7  Multi-Vitamin Oral Tablet Recorded   8  NexAVAR 200 MG Oral Tablet; 2 tabs in am , one tab in pm;   Therapy: 98TYK3291 to (Last Rx:28Mar2017)  Requested for: 28Mar2017 Ordered   9  OxyCODONE HCl - 5 MG Oral Tablet; Take 1 tablet every 6 hours as needed for pain; Therapy: 49AWE5975 to (Evaluate:46Tuy9658); Last Rx:27Loj1412 Ordered   10  Prochlorperazine Maleate 10 MG Oral Tablet; TAKE 1 TABLET EVERY 6 HOURS AS    NEEDED FOR NAUSEA; Therapy: 70KIX8278 to [phone])  Requested for: 22Aug2017; Last    Rx:67Cca1849 Ordered    Allergies    1   No Known Drug Allergies    Signatures   Electronically signed by : Aguilar Chong, ; Aug 30 2017  1:08PM EST                       (Author)

## 2018-01-15 VITALS
HEART RATE: 102 BPM | SYSTOLIC BLOOD PRESSURE: 120 MMHG | WEIGHT: 170.31 LBS | RESPIRATION RATE: 18 BRPM | BODY MASS INDEX: 23.84 KG/M2 | DIASTOLIC BLOOD PRESSURE: 60 MMHG | TEMPERATURE: 98.2 F | OXYGEN SATURATION: 96 % | HEIGHT: 71 IN

## 2018-01-15 VITALS
BODY MASS INDEX: 22.41 KG/M2 | OXYGEN SATURATION: 98 % | RESPIRATION RATE: 16 BRPM | TEMPERATURE: 98.5 F | WEIGHT: 156.5 LBS | HEIGHT: 70 IN | HEART RATE: 67 BPM | SYSTOLIC BLOOD PRESSURE: 130 MMHG | DIASTOLIC BLOOD PRESSURE: 68 MMHG

## 2018-01-16 NOTE — PROGRESS NOTES
Assessment    1  Ascites (789 59) (R18 8)   2  Hepatocellular carcinoma (155 0) (C22 0)   3  Liver cirrhosis (571 5) (K74 60)   4  Malignant neoplasm metastatic to lung, unspecified laterality (197 0) (C78 00)    Plan  Ascites    · Furosemide 40 MG Oral Tablet; TAKE 1 TABLET DAILY AS DIRECTED   Rx By: Kenia Robertson; Dispense: 30 Days ; #:30 Tablet; Refill: 3; For: Ascites; EDGAR = N; Verified Transmission to [facility] #[id number]; Last Updated By: SystemBex; 4/11/2017 2:09:50 PM   · Spironolactone 50 MG Oral Tablet; TAKE 2 TABLETS DAILY   Rx By: Kenia Robertson; Dispense: 30 Days ; #:60 Tablet; Refill: 3; For: Ascites; EDGAR = N; Verified Transmission to [facility] #164; Last Updated By: System, [employer]; 4/11/2017 2:09:50 PM  Malignant neoplasm metastatic to lung, unspecified laterality    · Follow-up visit in 3 weeks Evaluation and Treatment  Follow-up  Status: Complete  Done:  48QOF7382 01:00PM   Ordered; For: Malignant neoplasm metastatic to lung, unspecified laterality; Ordered By: Kenia Robertson Performed:  Due: 42MBS4546; Last Updated By: Julia Estrella; 4/11/2017 2:34:40 PM    Discussion/Summary  Discussion Summary:   Hepatocellular carcinoma in the background of hepatitis C and subsequent cirrhosis of the liver, along with pulmonary metastasis involving the left pleural based mass, he received sir spheres In October 2016, unfortunately he has progression of the disease in the liver and tumor thrombi involving the portal vein, elevated alpha-fetoprotein, liver function tests 2/17  ECOG score of 1-2  Recurrent ascites  No clinical study available at this time  Sorafenib prescribed at his 3/28/17 office visit, however dose reduced to 600 mg by mouth daily (400 in the morning and 200 in the evening) and titrate the dose up  Approval and copay assistance approved today, 4/11/17   Re-reviewed that potential side effects of sorafenib could include but may not be limited to: hand and foot syndrome, hypertension, perforation, pancytopenia, fatigue, nausea, vomiting, diarrhea  He was reminded that we will monitor him every other week initially for the next one to 2 months with physical examination and CBC, CMP  He is aware his cancer is not curable and this is a stage IV hepatocellular carcinoma  Follow up in 2-3 weeks  Call if any issues or concerns  Chief Complaint  Chief Complaint Free Text Note Form: Hepatocellular carcinoma      History of Present Illness  HPI: 42-year-old -American male  who presented to the hospital in August 2016 with a right upper quadrant abdominal pain, CT scan showed cirrhosis of the liver with hepatosplenomegaly, right hepatic mass measuring 3 x 4 2 cm with satellite lesions consistent with hepatocellular carcinoma, alpha-fetoprotein of 52, subsequently the patient had a biopsy consistent with hepatocellular carcinoma, steatohepatitis variant  He also was found to have a highly reactive hepatitis C antibody, he had thrombocytopenia, low albumen  He quit smoking a few months ago, he has previous history of drug abuse while he was in [facility]  CT scan of the chest without IV contrast showed left upper lobe subpleural mass measuring 9 mm it was not appear end on the previous CT scan in 2015 might be single metastatic disease  Now with progression of disease proven by CT scan A/P 2/1/17; MRI 2/27/17 and elevation of alpha-fetoprotein from 84 11/28/17 to 112 2/15/17  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       Previous Therapy:   Sir Spheres Brachytherapy, excisional biopsy of the left thorax showed metastatic hepatocellular carcinoma       Current Therapy: Paracentesis when necessary       Interval History: Patient has been approved for a kindra through the [employer]  His co-payment will be $5 00 for his [employer] will be dispensing  They will be reaching out to the patient for scheduling shipment     Anticipated obtaining later this week  Sees Dr Zaida Valladares for pain management  Still very active  LE edema stable  Abdominal ascites stable  Breathing ok  Review of Systems  Complete-Male:   Constitutional: feeling tired  Eyes: No complaints of eye pain, no red eyes, no discharge from eyes, no itchy eyes  ENT: no complaints of earache, no hearing loss, no nosebleeds, no nasal discharge, no sore throat, no hoarseness  Cardiovascular: lower extremity edema, but the heart rate was not slow, no chest pain, the heart rate was not fast and no palpitations  Respiratory: No complaints of shortness of breath, no wheezing, no cough, no SOB on exertion, no orthopnea or PND  Gastrointestinal: constipation, distended and abdominal swelling  Genitourinary: No complaints of dysuria, no incontinence, no hesitancy, no nocturia, no genital lesion, no testicular pain  Musculoskeletal: arthralgias, myalgias and joint stiffness  Integumentary: No complaints of skin rash or skin lesions, no itching, no skin wound, no dry skin  Neurological: No compliants of headache, no confusion, no convulsions, no numbness or tingling, no dizziness or fainting, no limb weakness, no difficulty walking  Psychiatric: Is not suicidal, no sleep disturbances, no anxiety or depression, no change in personality, no emotional problems  Endocrine: No complaints of proptosis, no hot flashes, no muscle weakness, no erectile dysfunction, no deepening of the voice, no feelings of weakness  Hematologic/Lymphatic: No complaints of swollen glands, no swollen glands in the neck, does not bleed easily, no easy bruising  Active Problems    1  Abdominal pain, RUQ (789 01) (R10 11)   2  Ascites (789 59) (R18 8)   3  Bloating (787 3) (R14 0)   4  Cancer related pain (338 3) (G89 3)   5  Difficulty sleeping (780 50) (G47 9)   6  Extensor tendon laceration of finger with open wound, subsequent encounter (V58 89)   (S66 529D,S61 209D)   7   Family history of colon cancer (V16 0) (Z80 0)   8  Fatigue (780 79) (R53 83)   9  [place] disease (277 4) (E80 4)   10  Hepatitis C (070 70) (B19 20)   11  Hepatocellular carcinoma (155 0) (C22 0)   12  History of colon polyps (V12 72) (Z86 010)   13  Liver cirrhosis (571 5) (K74 60)   14  Liver disease (573 9) (K76 9)   15  Lung nodule (793 11) (R91 1)   16  Malignant neoplasm metastatic to lung, unspecified laterality (197 0) (C78 00)   17  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)   18  Spleen laceration (865 09) (S36 039A)   19  Thrombocytopenia (287 5) (D69 6)    Past Medical History    1  History of Gilbert's disease (277 4) (E80 4)    Surgical History    1  History of Biopsy Of Liver   2  History of Hand Incision Tendon Sheath Of A Finger   3  History of Hernia Repair   4  History of Tonsillectomy   5  History of Wedge Resection Of Lung  Surgical History Reviewed: The surgical history was reviewed and updated today  Family History  Father    1  Family history of lung cancer (V16 1) (Z80 1)  Brother    2  Family history of Colon carcinoma  Grandfather    3  Family history of Colon carcinoma  Family History Reviewed: The family history was reviewed and updated today  Social History    · Denied: History of Alcohol use   · Always uses seat belt   ·    · Drug use (305 90) (F19 90)   · Former smoker (V15 82) (Z87 891)   · No alcohol use   · Previous  service   · Retired  Social History Reviewed: The social history was reviewed and updated today  Current Meds   1  FentaNYL 12 MCG/HR Transdermal Patch 72 Hour; APPLY 1 PATCH EVERY 3 DAYS; Therapy: 82HDL3173 to (Evaluate:87Tho8589); Last Rx:06Apr2017 Ordered   2  Furosemide 40 MG Oral Tablet; TAKE 1 TABLET DAILY AS DIRECTED; Therapy: 25OTB5868 to (Evaluate:15Jun2017)  Requested for: 52YKO1047; Last   Rx:15Feb2017 Ordered   3  Lidocaine 5 % External Ointment; APPLY TO PAINFUL AREAS on left later rib cage 2 TO 3   TIMES DAILY AS NEEDED;    Therapy: 64SPW1315 to (Evaluate:28Feb2017); Last Rx:30Nov2016 Ordered   4  Methylphenidate HCl - 10 MG Oral Tablet; 1 tablet daily as needed for fatigue; Therapy: 70UTS5762 to (Evaluate:06May2017); Last Rx:06Apr2017 Ordered   5  Multi-Vitamin Oral Tablet Recorded   6  NexAVAR 200 MG Oral Tablet; 2 tabs in am , one tab in pm;   Therapy: 00UEZ7419 to (Last Rx:28Mar2017)  Requested for: 28Mar2017 Ordered   7  OxyCODONE HCl - 5 MG Oral Tablet; Take 1 tablet every 6 hours as needed for pain; Therapy: 17RDE5554 to (Evaluate:06May2017); Last Rx:06Apr2017 Ordered   8  Spironolactone 50 MG Oral Tablet; TAKE 2 TABLETS DAILY; Therapy: 84OXF1926 to (Evaluate:15Jun2017)  Requested for: 40YDH4475; Last   Rx:15Feb2017 Ordered  Medication List Reviewed: The medication list was reviewed and updated today  Allergies    1  No Known Drug Allergies    Vitals  Vital Signs    Recorded: 11Apr2017 01:46PM   Temperature 96 5 F, Tympanic   Heart Rate 79   Respiration 18   Systolic 756, RUE, Sitting   Diastolic 75, RUE, Sitting   Height 5 ft 10 in   Weight 162 lb 12 8 oz   BMI Calculated 23 36   BSA Calculated 1 91   O2 Saturation 88   Pain Scale 3     Physical Exam    Constitutional   General appearance: No acute distress, well appearing and well nourished  Eyes   Conjunctiva and lids: No swelling, erythema, or discharge  Pupils and irises: Equal, round and reactive to light  Ears, Nose, Mouth, and Throat   Nasal mucosa, septum, and turbinates: Normal without edema or erythema  Oropharynx: Normal with no erythema, edema, exudate or lesions  Pulmonary   Auscultation of lungs: Clear to auscultation, equal breath sounds bilaterally, no wheezes, no rales, no rhonci  Cardiovascular   Auscultation of heart: Normal rate and rhythm, normal S1 and S2, without murmurs  Examination of extremities for edema and/or varicosities: Normal     Carotid pulses: Normal     Abdomen   Abdomen: Abnormal   The abdomen was distended     Liver and spleen: Abnormal   The liver was enlarged and measured as a 7 cm span  Lymphatic   Palpation of lymph nodes in neck: No lymphadenopathy  Musculoskeletal   Gait and station: Normal     Digits and nails: Normal without clubbing or cyanosis  Inspection/palpation of joints, bones, and muscles: Normal     Skin   Skin and subcutaneous tissue: Normal without rashes or lesions  Neurologic   Sensation: No sensory loss  Psychiatric   Orientation to person, place and time: Normal     Mood and affect: Normal           ECOG 0       Health Management  History of colon polyps   COLONOSCOPY (GI, SURG); every 3 years; Last 23SHY9260; Next Due: 53NWI5566; Active    Future Appointments    Date/Time Provider Specialty Site   05/04/2017 11:00 AM AIDEN Phelps  [facility] [place] [id number]   05/01/2017 02:00 PM Tyler Camara [facility] H[facility]ology [facility]     Signatures   Electronically signed by : Michael Figueroa [facility]; Apr 11 2017  2:58PM EST                       (Author)    Electronically signed by :  AIDEN Price ; Apr 11 2017  6:08PM EST                       (Author)

## 2018-01-18 NOTE — MISCELLANEOUS
Message  spoke with daughter Edwardo Martines  she is inquiring about home health support  [facility] consult made, [facility] nurse out to see patient today, plan of care reviewed  emotional support provided      Active Problems    1  Abdominal pain, RUQ (789 01) (R10 11)   2  Abnormal weight loss (783 21) (R63 4)   3  Ascites (789 59) (R18 8)   4  Bloating (787 3) (R14 0)   5  Cancer related pain (338 3) (G89 3)   6  Difficulty sleeping (780 50) (G47 9)   7  Extensor tendon laceration of finger with open wound, subsequent encounter (V58 89)   (S66 529D,S61 209D)   8  Family history of colon cancer (V16 0) (Z80 0)   9  Fatigue (780 79) (R53 83)   10  [place] disease (277 4) (E80 4)   11  Hepatitis C (070 70) (B19 20)   12  Hepatocellular carcinoma (155 0) (C22 0)   13  History of colon polyps (V12 72) (Z86 010)   14  Liver cirrhosis (571 5) (K74 60)   15  Liver disease (573 9) (K76 9)   16  Lung nodule (793 11) (R91 1)   17  Malignant neoplasm metastatic to lung, unspecified laterality (197 0) (C78 00)   18  Oral candidiasis (112 0) (B37 0)   19  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)   20  Spleen laceration (865 09) (S36 039A)   21  Thrombocytopenia (287 5) (D69 6)    Current Meds   1  DiazePAM 2 MG Oral Tablet; TAKE 1 TABLET AT BEDTIME; Therapy: 21YUP3831 to (Evaluate:59Mrz4871); Last Rx:77Xpt7874 Ordered   2  FentaNYL 25 MCG/HR Transdermal Patch 72 Hour; APPLY 1 PATCH EVERY 3 DAYS; Therapy: 45QNW4875 to (Evaluate:36Ptn9772); Last Rx:76Tmy0259 Ordered   3  Fluconazole 100 MG Oral Tablet; TAKE 2 TABLETS ON DAY 1, THEN 1 TABLET DAILY   until gone; Therapy: 64AXA7925 to (Evaluate:10Aug2017)  Requested for: 61HUZ2494; Last   Rx:07Fhd1771 Ordered   4  Furosemide 40 MG Oral Tablet; TAKE 1 TABLET DAILY AS DIRECTED; Therapy: 82YTG0483 to (Evaluate:34Yel7552)  Requested for: 11Apr2017; Last   Rx:11Apr2017 Ordered   5   Lidocaine 5 % External Ointment; APPLY TO PAINFUL AREAS on left later rib cage 2 TO   3 TIMES DAILY AS NEEDED; Therapy: 57SUD0046 to (Evaluate:03Dhe4429); Last Rx:94Hwa3659 Ordered   6  Methylphenidate HCl - 5 MG Oral Tablet; 1 tablet daily as needed for fatigue  Do not take   after 1pm;   Therapy: 28YGZ0483 to (Evaluate:12Nhe2576); Last Rx:66Wqr1938 Ordered   7  Multi-Vitamin Oral Tablet Recorded   8  NexAVAR 200 MG Oral Tablet; 2 tabs in am , one tab in pm;   Therapy: 27YEJ0065 to (Last Rx:28Mar2017)  Requested for: 28Mar2017 Ordered   9  OxyCODONE HCl - 5 MG Oral Tablet; Take 1 tablet every 6 hours as needed for pain; Therapy: 18ARW4308 to (Evaluate:64Gxr8305); Last Rx:31Jul2017 Ordered   10  Prochlorperazine Maleate 10 MG Oral Tablet; TAKE 1 TABLET EVERY 6 HOURS AS    NEEDED FOR NAUSEA; Therapy: 30TLC4839 to (Evaluate:05Are9804)  Requested for: 52KHZ0737; Last    Rx:21Pwf7689 Ordered    Allergies    1   No Known Drug Allergies    Signatures   Electronically signed by : Mary Ann Fonseca, ; Aug 15 2017 11:47AM EST                       (Author)

## 2018-01-22 VITALS
TEMPERATURE: 96.5 F | BODY MASS INDEX: 23.31 KG/M2 | SYSTOLIC BLOOD PRESSURE: 120 MMHG | RESPIRATION RATE: 18 BRPM | DIASTOLIC BLOOD PRESSURE: 75 MMHG | HEART RATE: 79 BPM | WEIGHT: 162.8 LBS | OXYGEN SATURATION: 88 % | HEIGHT: 70 IN

## 2018-01-22 VITALS
OXYGEN SATURATION: 98 % | BODY MASS INDEX: 24.01 KG/M2 | TEMPERATURE: 98.8 F | RESPIRATION RATE: 17 BRPM | SYSTOLIC BLOOD PRESSURE: 122 MMHG | HEART RATE: 91 BPM | HEIGHT: 71 IN | DIASTOLIC BLOOD PRESSURE: 70 MMHG | WEIGHT: 171.5 LBS